# Patient Record
Sex: FEMALE | Race: WHITE | NOT HISPANIC OR LATINO | Employment: OTHER | URBAN - METROPOLITAN AREA
[De-identification: names, ages, dates, MRNs, and addresses within clinical notes are randomized per-mention and may not be internally consistent; named-entity substitution may affect disease eponyms.]

---

## 2018-02-21 ENCOUNTER — OFFICE VISIT (OUTPATIENT)
Dept: CARDIOLOGY | Facility: CLINIC | Age: 73
End: 2018-02-21
Payer: MEDICARE

## 2018-02-21 VITALS
SYSTOLIC BLOOD PRESSURE: 133 MMHG | WEIGHT: 179.5 LBS | BODY MASS INDEX: 31.8 KG/M2 | HEIGHT: 63 IN | HEART RATE: 75 BPM | DIASTOLIC BLOOD PRESSURE: 71 MMHG

## 2018-02-21 DIAGNOSIS — I10 ESSENTIAL HYPERTENSION: ICD-10-CM

## 2018-02-21 DIAGNOSIS — I48.20 CHRONIC ATRIAL FIBRILLATION: Primary | ICD-10-CM

## 2018-02-21 DIAGNOSIS — I11.9 HYPERTENSIVE HEART DISEASE WITHOUT HEART FAILURE: ICD-10-CM

## 2018-02-21 DIAGNOSIS — J42 CHRONIC BRONCHITIS, UNSPECIFIED CHRONIC BRONCHITIS TYPE: ICD-10-CM

## 2018-02-21 DIAGNOSIS — E11.9 TYPE 2 DIABETES MELLITUS WITHOUT COMPLICATION, WITHOUT LONG-TERM CURRENT USE OF INSULIN: ICD-10-CM

## 2018-02-21 PROCEDURE — 3008F BODY MASS INDEX DOCD: CPT | Mod: S$GLB,,, | Performed by: INTERNAL MEDICINE

## 2018-02-21 PROCEDURE — 93000 ELECTROCARDIOGRAM COMPLETE: CPT | Mod: S$GLB,,, | Performed by: INTERNAL MEDICINE

## 2018-02-21 PROCEDURE — 1159F MED LIST DOCD IN RCRD: CPT | Mod: S$GLB,,, | Performed by: INTERNAL MEDICINE

## 2018-02-21 PROCEDURE — 99213 OFFICE O/P EST LOW 20 MIN: CPT | Mod: S$GLB,,, | Performed by: INTERNAL MEDICINE

## 2018-02-21 NOTE — PROGRESS NOTES
Subjective:      Patient ID: Teresita Hodges is a 72 y.o. female.    Chief Complaint: Follow-up    HPI:   Doing fine except for chronic back pain.  Used to go to pain management.     Review of Systems   Cardiovascular: Positive for dyspnea on exertion (chronic) and leg swelling (chronic). Negative for chest pain, claudication, irregular heartbeat, near-syncope, orthopnea, palpitations and syncope.      Still smokes a pack of cigarets a day.    Last HgbA1c 6.7  Past Medical History:   Diagnosis Date    Atrial fibrillation     COPD (chronic obstructive pulmonary disease)     Diabetes mellitus     HCVD (hypertensive cardiovascular disease)     Hyperlipidemia     Hypernatremia     Hypertension     Insomnia     Restless leg syndrome         No past surgical history on file.    No family history on file.    Social History     Social History    Marital status:      Spouse name: N/A    Number of children: N/A    Years of education: N/A     Social History Main Topics    Smoking status: Current Every Day Smoker     Packs/day: 1.00    Smokeless tobacco: Never Used    Alcohol use None    Drug use: Unknown    Sexual activity: Not Asked     Other Topics Concern    None     Social History Narrative    None       Current Outpatient Prescriptions on File Prior to Visit   Medication Sig Dispense Refill    aspirin (ECOTRIN) 81 MG EC tablet Take 81 mg by mouth once daily.      bumetanide (BUMEX) 1 MG tablet Take 1 mg by mouth once a week.       cholecalciferol, vitamin D3, 2,000 unit Tab Take by mouth once daily.      clonazePAM (KLONOPIN) 0.5 MG tablet Take 0.5 mg by mouth once daily.       diltiazem (CARDIZEM CD) 240 MG 24 hr capsule every morning.       diltiazem (CARDIZEM) 60 MG tablet Take 60 mg by mouth every evening.      DIPHENHYDRAMINE HCL (BENADRYL ORAL) Take by mouth.      fish oil-omega-3 fatty acids 300-1,000 mg capsule Take 2 g by mouth 2 (two) times daily.      FOLIC  "ACID/MULTIVIT-MIN/LUTEIN (CENTRUM SILVER ORAL) Take by mouth.      gabapentin (NEURONTIN) 300 MG capsule Take 300 mg by mouth every evening.      glimepiride (AMARYL) 1 MG tablet Take 1 mg by mouth 3 (three) times daily.       guaifenesin-codeine 100-10 mg/5 ml (TUSSI-ORGANIDIN NR)  mg/5 mL syrup Take 5 mLs by mouth 3 (three) times daily as needed for Cough.      hydrochlorothiazide (HYDRODIURIL) 25 MG tablet Take 25 mg by mouth once daily.       hydrochlorothiazide (MICROZIDE) 12.5 mg capsule Take 12.5 mg by mouth once daily.       hydrocodone-acetaminophen 7.5-325mg (NORCO) 7.5-325 mg per tablet       losartan (COZAAR) 50 MG tablet Take 50 mg by mouth once daily.       metformin (GLUCOPHAGE) 500 MG tablet Take 500 mg by mouth 2 (two) times daily with meals.       nabumetone (RELAFEN) 500 MG tablet Take 500 mg by mouth once daily.       oxybutynin (DITROPAN-XL) 5 MG TR24 5 mg 2 (two) times daily.       potassium chloride (MICRO-K) 10 MEQ CpSR 10 mEq 2 (two) times daily.       PROAIR HFA 90 mcg/actuation inhaler       tizanidine (ZANAFLEX) 2 MG tablet Take 2 mg by mouth every 8 (eight) hours.        No current facility-administered medications on file prior to visit.        Review of patient's allergies indicates:   Allergen Reactions    Keflex [cephalexin]      Objective:     Vitals:    02/21/18 1335   BP: 133/71   BP Location: Left arm   Patient Position: Sitting   BP Method: Large (Automatic)   Pulse: 75   Weight: 81.4 kg (179 lb 8 oz)   Height: 5' 3" (1.6 m)        Physical Exam   Constitutional: She is oriented to person, place, and time. She appears well-developed and well-nourished.   Eyes: No scleral icterus.   Neck: No JVD present. Carotid bruit is not present.   Cardiovascular: Normal rate.  An irregularly irregular rhythm present. Exam reveals no gallop.    No murmur heard.  Pulmonary/Chest: Breath sounds normal.   Musculoskeletal: She exhibits edema (trace  pitting pedal edema " bilaterally ).   Neurological: She is alert and oriented to person, place, and time.   Skin: Skin is warm and dry.   Psychiatric: She has a normal mood and affect. Her behavior is normal. Judgment and thought content normal.   Vitals reviewed.     ECG: atrial fibrillation with controlled ventricular response, possible anterior infarct, no significant change.     Wt up 2 lbs    Assessment:     1. Chronic atrial fibrillation    2. Essential hypertension    3. Hypertensive heart disease without heart failure    4. Chronic bronchitis, unspecified chronic bronchitis type    5. Type 2 diabetes mellitus without complication, without long-term current use of insulin      Plan:   Teresita was seen today for follow-up.    Diagnoses and all orders for this visit:    Chronic atrial fibrillation  -     EKG 12-lead    Essential hypertension    Hypertensive heart disease without heart failure    Chronic bronchitis, unspecified chronic bronchitis type    Type 2 diabetes mellitus without complication, without long-term current use of insulin     Same meds  RTC 6 months  F/u with Dr Maldonado who does labs  Smoking cessation counseled  I have explained to pt in detail and at length why the risk of a blood clot stroke is much greater than her risk of bleeding from Eliquis.  I have explained that I strongly recommend pt take Eliquis in place of ASA.  Pt declines.Pt is concerned about the risk of bleeding.    Follow-up in about 6 months (around 8/21/2018).

## 2018-08-29 ENCOUNTER — OFFICE VISIT (OUTPATIENT)
Dept: CARDIOLOGY | Facility: CLINIC | Age: 73
End: 2018-08-29
Payer: MEDICARE

## 2018-08-29 VITALS
WEIGHT: 170 LBS | HEART RATE: 70 BPM | HEIGHT: 63 IN | DIASTOLIC BLOOD PRESSURE: 53 MMHG | SYSTOLIC BLOOD PRESSURE: 101 MMHG | BODY MASS INDEX: 30.12 KG/M2

## 2018-08-29 DIAGNOSIS — I10 ESSENTIAL HYPERTENSION: ICD-10-CM

## 2018-08-29 DIAGNOSIS — I48.20 CHRONIC ATRIAL FIBRILLATION: Primary | ICD-10-CM

## 2018-08-29 DIAGNOSIS — J42 CHRONIC BRONCHITIS, UNSPECIFIED CHRONIC BRONCHITIS TYPE: ICD-10-CM

## 2018-08-29 DIAGNOSIS — I11.9 HYPERTENSIVE HEART DISEASE WITHOUT HEART FAILURE: ICD-10-CM

## 2018-08-29 DIAGNOSIS — E11.9 TYPE 2 DIABETES MELLITUS WITHOUT COMPLICATION, WITHOUT LONG-TERM CURRENT USE OF INSULIN: ICD-10-CM

## 2018-08-29 PROCEDURE — 93000 ELECTROCARDIOGRAM COMPLETE: CPT | Mod: S$GLB,,, | Performed by: INTERNAL MEDICINE

## 2018-08-29 PROCEDURE — 3078F DIAST BP <80 MM HG: CPT | Mod: CPTII,S$GLB,, | Performed by: INTERNAL MEDICINE

## 2018-08-29 PROCEDURE — 99214 OFFICE O/P EST MOD 30 MIN: CPT | Mod: S$GLB,,, | Performed by: INTERNAL MEDICINE

## 2018-08-29 PROCEDURE — 3074F SYST BP LT 130 MM HG: CPT | Mod: CPTII,S$GLB,, | Performed by: INTERNAL MEDICINE

## 2018-08-29 RX ORDER — HYDROCODONE BITARTRATE AND ACETAMINOPHEN 10; 325 MG/1; MG/1
TABLET ORAL
COMMUNITY
Start: 2018-08-28 | End: 2019-02-06

## 2018-08-29 NOTE — PROGRESS NOTES
Subjective:      Patient ID: Teresita Hodges is a 73 y.o. female.    Chief Complaint: Follow-up (Afib)    HPI:  Pt still has chronic low back pain    Review of Systems   Cardiovascular: Positive for leg swelling. Negative for chest pain, claudication, dyspnea on exertion, irregular heartbeat, near-syncope, orthopnea, palpitations and syncope.      Can walk short distances with a walker.    Sister just bled to death on warfarin.     bled from colon on warfarin and  years ago.  Past Medical History:   Diagnosis Date    Atrial fibrillation     COPD (chronic obstructive pulmonary disease)     Diabetes mellitus     HCVD (hypertensive cardiovascular disease)     Hyperlipidemia     Hypernatremia     Hypertension     Insomnia     Restless leg syndrome         History reviewed. No pertinent surgical history.    History reviewed. No pertinent family history.    Social History     Socioeconomic History    Marital status:      Spouse name: None    Number of children: None    Years of education: None    Highest education level: None   Social Needs    Financial resource strain: None    Food insecurity - worry: None    Food insecurity - inability: None    Transportation needs - medical: None    Transportation needs - non-medical: None   Occupational History    None   Tobacco Use    Smoking status: Current Every Day Smoker     Packs/day: 1.00    Smokeless tobacco: Never Used   Substance and Sexual Activity    Alcohol use: None    Drug use: None    Sexual activity: None   Other Topics Concern    None   Social History Narrative    None       Current Outpatient Medications on File Prior to Visit   Medication Sig Dispense Refill    aspirin (ECOTRIN) 81 MG EC tablet Take 81 mg by mouth once daily.      bumetanide (BUMEX) 1 MG tablet Take 1 mg by mouth once a week.       cholecalciferol, vitamin D3, 2,000 unit Tab Take by mouth once daily.      clonazePAM (KLONOPIN) 0.5 MG tablet Take  "0.5 mg by mouth once daily.       diltiazem (CARDIZEM CD) 240 MG 24 hr capsule every morning.       diltiazem (CARDIZEM) 60 MG tablet Take 60 mg by mouth every evening.      DIPHENHYDRAMINE HCL (BENADRYL ORAL) Take by mouth.      fish oil-omega-3 fatty acids 300-1,000 mg capsule Take 2 g by mouth 2 (two) times daily.      FOLIC ACID/MULTIVIT-MIN/LUTEIN (CENTRUM SILVER ORAL) Take by mouth.      gabapentin (NEURONTIN) 300 MG capsule Take 300 mg by mouth every evening.      glimepiride (AMARYL) 1 MG tablet Take 1 mg by mouth 3 (three) times daily as needed.       guaifenesin-codeine 100-10 mg/5 ml (TUSSI-ORGANIDIN NR)  mg/5 mL syrup Take 5 mLs by mouth 3 (three) times daily as needed for Cough.      hydrochlorothiazide (HYDRODIURIL) 25 MG tablet Take 25 mg by mouth once daily.       hydrochlorothiazide (MICROZIDE) 12.5 mg capsule Take 12.5 mg by mouth once daily.       HYDROcodone-acetaminophen (NORCO)  mg per tablet       losartan (COZAAR) 50 MG tablet Take 50 mg by mouth once daily.       metformin (GLUCOPHAGE) 500 MG tablet Take 500 mg by mouth 2 (two) times daily with meals.       oxybutynin (DITROPAN-XL) 5 MG TR24 5 mg 2 (two) times daily.       potassium chloride (MICRO-K) 10 MEQ CpSR 10 mEq 2 (two) times daily.       PROAIR HFA 90 mcg/actuation inhaler       tizanidine (ZANAFLEX) 2 MG tablet Take 2 mg by mouth every 8 (eight) hours.       [DISCONTINUED] hydrocodone-acetaminophen 7.5-325mg (NORCO) 7.5-325 mg per tablet       [DISCONTINUED] nabumetone (RELAFEN) 500 MG tablet Take 500 mg by mouth once daily.        No current facility-administered medications on file prior to visit.        Review of patient's allergies indicates:   Allergen Reactions    Keflex [cephalexin]      Objective:     Vitals:    08/29/18 1355   BP: (!) 101/53   BP Location: Left arm   Patient Position: Sitting   BP Method: Large (Automatic)   Pulse: 70   Weight: 77.1 kg (170 lb)   Height: 5' 3" (1.6 m)    "     Physical Exam   Constitutional: She is oriented to person, place, and time. She appears well-developed and well-nourished.   Eyes: No scleral icterus.   Neck: No JVD present. Carotid bruit is not present.   Cardiovascular: Normal rate. An irregularly irregular rhythm present. Exam reveals no gallop.   No murmur heard.  Pulses:       Dorsalis pedis pulses are 1+ on the right side, and 2+ on the left side.        Posterior tibial pulses are 1+ on the right side, and 2+ on the left side.   Pulmonary/Chest: She has wheezes in the right upper field, the right middle field, the right lower field, the left upper field, the left middle field and the left lower field.   Musculoskeletal: She exhibits edema (trace edema bilat).   Neurological: She is alert and oriented to person, place, and time.   Skin: Skin is warm and dry.   Psychiatric: She has a normal mood and affect. Her behavior is normal. Judgment and thought content normal.   Vitals reviewed.     ECG: atrial fibrillation with controlled ventricular response, right axis deviation, anterior scar. Unchanged  Assessment:     1. Chronic atrial fibrillation    2. Hypertensive heart disease without heart failure    3. Essential hypertension    4. Type 2 diabetes mellitus without complication, without long-term current use of insulin    5. Chronic bronchitis, unspecified chronic bronchitis type      Plan:   Teresita was seen today for follow-up.    Diagnoses and all orders for this visit:    Chronic atrial fibrillation  -     IN OFFICE EKG 12-LEAD (to [facility])    Hypertensive heart disease without heart failure    Essential hypertension    Type 2 diabetes mellitus without complication, without long-term current use of insulin    Chronic bronchitis, unspecified chronic bronchitis type       Pt still declines anticoagulation due to fear of hemorrhagic complications even though pt understands the benefit of preventing blood clot strokes.    Same meds      F/u with   izabel    RTC 6 months  Follow-up in about 6 months (around 2/28/2019).

## 2019-02-06 ENCOUNTER — OFFICE VISIT (OUTPATIENT)
Dept: CARDIOLOGY | Facility: CLINIC | Age: 74
End: 2019-02-06
Payer: MEDICARE

## 2019-02-06 VITALS
HEIGHT: 63 IN | DIASTOLIC BLOOD PRESSURE: 70 MMHG | HEART RATE: 87 BPM | SYSTOLIC BLOOD PRESSURE: 132 MMHG | BODY MASS INDEX: 31.18 KG/M2 | WEIGHT: 176 LBS

## 2019-02-06 DIAGNOSIS — J41.0 SIMPLE CHRONIC BRONCHITIS: ICD-10-CM

## 2019-02-06 DIAGNOSIS — I48.20 CHRONIC ATRIAL FIBRILLATION: Primary | ICD-10-CM

## 2019-02-06 DIAGNOSIS — R01.1 SYSTOLIC MURMUR: ICD-10-CM

## 2019-02-06 DIAGNOSIS — I10 ESSENTIAL HYPERTENSION: ICD-10-CM

## 2019-02-06 DIAGNOSIS — R60.0 LOCALIZED EDEMA: ICD-10-CM

## 2019-02-06 PROCEDURE — 1101F PR PT FALLS ASSESS DOC 0-1 FALLS W/OUT INJ PAST YR: ICD-10-PCS | Mod: CPTII,S$GLB,, | Performed by: INTERNAL MEDICINE

## 2019-02-06 PROCEDURE — 1101F PT FALLS ASSESS-DOCD LE1/YR: CPT | Mod: CPTII,S$GLB,, | Performed by: INTERNAL MEDICINE

## 2019-02-06 PROCEDURE — 3075F PR MOST RECENT SYSTOLIC BLOOD PRESS GE 130-139MM HG: ICD-10-PCS | Mod: CPTII,S$GLB,, | Performed by: INTERNAL MEDICINE

## 2019-02-06 PROCEDURE — 99213 PR OFFICE/OUTPT VISIT, EST, LEVL III, 20-29 MIN: ICD-10-PCS | Mod: S$GLB,,, | Performed by: INTERNAL MEDICINE

## 2019-02-06 PROCEDURE — 3078F PR MOST RECENT DIASTOLIC BLOOD PRESSURE < 80 MM HG: ICD-10-PCS | Mod: CPTII,S$GLB,, | Performed by: INTERNAL MEDICINE

## 2019-02-06 PROCEDURE — 99213 OFFICE O/P EST LOW 20 MIN: CPT | Mod: S$GLB,,, | Performed by: INTERNAL MEDICINE

## 2019-02-06 PROCEDURE — 93000 ELECTROCARDIOGRAM COMPLETE: CPT | Mod: S$GLB,,, | Performed by: INTERNAL MEDICINE

## 2019-02-06 PROCEDURE — 93000 EKG 12-LEAD: ICD-10-PCS | Mod: S$GLB,,, | Performed by: INTERNAL MEDICINE

## 2019-02-06 PROCEDURE — 3075F SYST BP GE 130 - 139MM HG: CPT | Mod: CPTII,S$GLB,, | Performed by: INTERNAL MEDICINE

## 2019-02-06 PROCEDURE — 3078F DIAST BP <80 MM HG: CPT | Mod: CPTII,S$GLB,, | Performed by: INTERNAL MEDICINE

## 2019-02-06 RX ORDER — CODEINE PHOSPHATE AND GUAIFENESIN 10; 100 MG/5ML; MG/5ML
5 SOLUTION ORAL 3 TIMES DAILY PRN
COMMUNITY
End: 2020-12-17

## 2019-02-06 RX ORDER — HYDROCODONE BITARTRATE AND IBUPROFEN 7.5; 2 MG/1; MG/1
1 TABLET, FILM COATED ORAL
COMMUNITY
Start: 2019-01-14 | End: 2019-08-07

## 2019-02-06 NOTE — PROGRESS NOTES
Subjective:      Patient ID: Teresita Hodges is a 73 y.o. female.    Chief Complaint: Follow-up (Afib)    HPI: Feels OK.   Can walk short distances with walker.  Walks in house.  Has spasms in back  Dr Maldonado referred pt to Dr Roberson, neurologist.  Pt has seen Dr Cruz and Danny and Lynette    Review of Systems   Cardiovascular: Positive for leg swelling (chronic, bilateral). Negative for chest pain, claudication, dyspnea on exertion, irregular heartbeat, near-syncope, orthopnea, palpitations and syncope.        Past Medical History:   Diagnosis Date    Atrial fibrillation     COPD (chronic obstructive pulmonary disease)     Diabetes mellitus     HCVD (hypertensive cardiovascular disease)     Hyperlipidemia     Hypernatremia     Hypertension     Insomnia     Restless leg syndrome         No past surgical history on file.    No family history on file.    Social History     Socioeconomic History    Marital status:      Spouse name: None    Number of children: None    Years of education: None    Highest education level: None   Social Needs    Financial resource strain: None    Food insecurity - worry: None    Food insecurity - inability: None    Transportation needs - medical: None    Transportation needs - non-medical: None   Occupational History    None   Tobacco Use    Smoking status: Current Every Day Smoker     Packs/day: 1.00    Smokeless tobacco: Never Used   Substance and Sexual Activity    Alcohol use: None    Drug use: None    Sexual activity: None   Other Topics Concern    None   Social History Narrative    None       Current Outpatient Medications on File Prior to Visit   Medication Sig Dispense Refill    aspirin (ECOTRIN) 81 MG EC tablet Take 81 mg by mouth once daily.      bumetanide (BUMEX) 1 MG tablet Take 1 mg by mouth once a week.       cholecalciferol, vitamin D3, 2,000 unit Tab Take by mouth once daily.      clonazePAM (KLONOPIN) 0.5 MG tablet  Take 0.5 mg by mouth once daily.       diltiazem (CARDIZEM CD) 240 MG 24 hr capsule every morning.       diltiazem (CARDIZEM) 60 MG tablet Take 60 mg by mouth every evening.      DIPHENHYDRAMINE HCL (BENADRYL ORAL) Take by mouth.      fish oil-omega-3 fatty acids 300-1,000 mg capsule Take 2 g by mouth 2 (two) times daily.      gabapentin (NEURONTIN) 300 MG capsule Take 300 mg by mouth every evening.      glimepiride (AMARYL) 1 MG tablet Take 1 mg by mouth 3 (three) times daily as needed.       guaifenesin-codeine 100-10 mg/5 ml (TUSSI-ORGANIDIN NR)  mg/5 mL syrup Take 5 mLs by mouth 3 (three) times daily as needed for Cough.      hydrocodone-ibuprofen 7.5-200 mg (VICOPROFEN) 7.5-200 mg per tablet 1 tablet. Take one tablet 5 times daily      metformin (GLUCOPHAGE) 500 MG tablet Take 500 mg by mouth 2 (two) times daily with meals.       oxybutynin (DITROPAN-XL) 5 MG TR24 5 mg 2 (two) times daily.       potassium chloride (MICRO-K) 10 MEQ CpSR 10 mEq 2 (two) times daily.       PROAIR HFA 90 mcg/actuation inhaler       tizanidine (ZANAFLEX) 2 MG tablet Take 2 mg by mouth every 8 (eight) hours.       [DISCONTINUED] FOLIC ACID/MULTIVIT-MIN/LUTEIN (CENTRUM SILVER ORAL) Take by mouth.      [DISCONTINUED] guaifenesin-codeine 100-10 mg/5 ml (TUSSI-ORGANIDIN NR)  mg/5 mL syrup Take 5 mLs by mouth 3 (three) times daily as needed for Cough.      [DISCONTINUED] hydrochlorothiazide (HYDRODIURIL) 25 MG tablet Take 25 mg by mouth once daily.       [DISCONTINUED] hydrochlorothiazide (MICROZIDE) 12.5 mg capsule Take 12.5 mg by mouth once daily.       [DISCONTINUED] HYDROcodone-acetaminophen (NORCO)  mg per tablet       [DISCONTINUED] losartan (COZAAR) 50 MG tablet Take 50 mg by mouth once daily.        No current facility-administered medications on file prior to visit.        Review of patient's allergies indicates:   Allergen Reactions    Keflex [cephalexin]      Objective:     Vitals:     "02/06/19 1434 02/06/19 1502   BP: 132/70    BP Location: Left arm    Patient Position: Sitting    BP Method: Large (Automatic)    Pulse: (!) 44 87  Comment: irregular   Weight: 79.8 kg (176 lb)    Height: 5' 3" (1.6 m)         Physical Exam   Constitutional: She is oriented to person, place, and time. She appears well-developed and well-nourished.   Eyes: No scleral icterus.   Neck: No JVD present. Carotid bruit is not present.   Cardiovascular: Normal rate. An irregularly irregular rhythm present. Exam reveals no gallop.   Murmur (III/VI systolic) heard.  Pulmonary/Chest: She has wheezes.   Musculoskeletal: She exhibits edema (trace to one plus edema bilaterally).   Neurological: She is alert and oriented to person, place, and time.   Skin: Skin is warm and dry.   Psychiatric: She has a normal mood and affect. Her behavior is normal. Judgment and thought content normal.   Vitals reviewed.     ECG: atrial fibrillation with controlled ventricular response, Olaf beats, abnormal R wave progression in precordial leads.Unchanged    Wt relatively stable  Assessment:     1. Chronic atrial fibrillation    2. Essential hypertension    3. Simple chronic bronchitis    4. Systolic murmur    5. Localized edema      Plan:   Teresita was seen today for follow-up.    Diagnoses and all orders for this visit:    Chronic atrial fibrillation  -     IN OFFICE EKG 12-LEAD (to Muse)  -     Transthoracic echo (TTE) complete (Cupid Only); Future    Essential hypertension    Simple chronic bronchitis    Systolic murmur  -     Transthoracic echo (TTE) complete (Cupid Only); Future    Localized edema     Pt advised to quit cigarettes    Edema due to diltiazem    Same meds    RTC 6 months    F/u with Dr Maldonado who does labs.    Echocardiogram to evaluate heart murmur    Follow-up in about 6 months (around 8/6/2019).     Pt again declines formal anticoagulation.  I have explained to pt again the benefit of anticoagulation for preventing a " stroke    F/u with Dr Maldonado who does labs

## 2019-08-07 ENCOUNTER — OFFICE VISIT (OUTPATIENT)
Dept: CARDIOLOGY | Facility: CLINIC | Age: 74
End: 2019-08-07
Payer: MEDICARE

## 2019-08-07 VITALS
BODY MASS INDEX: 30.48 KG/M2 | SYSTOLIC BLOOD PRESSURE: 139 MMHG | HEART RATE: 80 BPM | HEIGHT: 63 IN | WEIGHT: 172 LBS | DIASTOLIC BLOOD PRESSURE: 62 MMHG

## 2019-08-07 DIAGNOSIS — I11.9 HYPERTENSIVE HEART DISEASE WITHOUT HEART FAILURE: ICD-10-CM

## 2019-08-07 DIAGNOSIS — E11.9 TYPE 2 DIABETES MELLITUS WITHOUT COMPLICATION, WITHOUT LONG-TERM CURRENT USE OF INSULIN: ICD-10-CM

## 2019-08-07 DIAGNOSIS — R60.0 LOCALIZED EDEMA: ICD-10-CM

## 2019-08-07 DIAGNOSIS — I10 ESSENTIAL HYPERTENSION: ICD-10-CM

## 2019-08-07 DIAGNOSIS — I48.20 CHRONIC ATRIAL FIBRILLATION: Primary | ICD-10-CM

## 2019-08-07 PROCEDURE — 3077F SYST BP >= 140 MM HG: CPT | Mod: CPTII,S$GLB,, | Performed by: INTERNAL MEDICINE

## 2019-08-07 PROCEDURE — 3078F PR MOST RECENT DIASTOLIC BLOOD PRESSURE < 80 MM HG: ICD-10-PCS | Mod: CPTII,S$GLB,, | Performed by: INTERNAL MEDICINE

## 2019-08-07 PROCEDURE — 3077F PR MOST RECENT SYSTOLIC BLOOD PRESSURE >= 140 MM HG: ICD-10-PCS | Mod: CPTII,S$GLB,, | Performed by: INTERNAL MEDICINE

## 2019-08-07 PROCEDURE — 93000 EKG 12-LEAD: ICD-10-PCS | Mod: S$GLB,,, | Performed by: INTERNAL MEDICINE

## 2019-08-07 PROCEDURE — 3078F DIAST BP <80 MM HG: CPT | Mod: CPTII,S$GLB,, | Performed by: INTERNAL MEDICINE

## 2019-08-07 PROCEDURE — 93000 ELECTROCARDIOGRAM COMPLETE: CPT | Mod: S$GLB,,, | Performed by: INTERNAL MEDICINE

## 2019-08-07 PROCEDURE — 99214 PR OFFICE/OUTPT VISIT, EST, LEVL IV, 30-39 MIN: ICD-10-PCS | Mod: S$GLB,,, | Performed by: INTERNAL MEDICINE

## 2019-08-07 PROCEDURE — 1101F PT FALLS ASSESS-DOCD LE1/YR: CPT | Mod: CPTII,S$GLB,, | Performed by: INTERNAL MEDICINE

## 2019-08-07 PROCEDURE — 1101F PR PT FALLS ASSESS DOC 0-1 FALLS W/OUT INJ PAST YR: ICD-10-PCS | Mod: CPTII,S$GLB,, | Performed by: INTERNAL MEDICINE

## 2019-08-07 PROCEDURE — 99214 OFFICE O/P EST MOD 30 MIN: CPT | Mod: S$GLB,,, | Performed by: INTERNAL MEDICINE

## 2019-08-07 RX ORDER — HYDROCODONE BITARTRATE AND ACETAMINOPHEN 10; 325 MG/1; MG/1
TABLET ORAL
COMMUNITY
Start: 2019-07-17 | End: 2020-12-17

## 2019-08-07 RX ORDER — HYDROCHLOROTHIAZIDE 12.5 MG/1
12.5 CAPSULE ORAL DAILY
COMMUNITY
End: 2021-07-13

## 2019-08-07 RX ORDER — HYDROCHLOROTHIAZIDE 25 MG/1
25 TABLET ORAL DAILY
COMMUNITY
Start: 2019-07-22 | End: 2021-07-13

## 2019-08-07 RX ORDER — ONDANSETRON 4 MG/1
TABLET, FILM COATED ORAL
COMMUNITY
Start: 2019-07-09

## 2019-08-07 RX ORDER — LOSARTAN POTASSIUM 50 MG/1
50 TABLET ORAL DAILY
COMMUNITY

## 2019-08-07 RX ORDER — DOCUSATE SODIUM 100 MG/1
100 CAPSULE, LIQUID FILLED ORAL 2 TIMES DAILY
COMMUNITY

## 2019-08-07 NOTE — PROGRESS NOTES
Subjective:      Patient ID: Teresita Hodges is a 74 y.o. female.    Chief Complaint: Follow-up (Afib)    HPI:   Main problem is chronic low back pain.    Pt is limited by right hip and back pain rather than shortness of breath.    Pt has been to PT.    Dr Mello retired.    Review of Systems   Cardiovascular: Positive for leg swelling. Negative for chest pain, claudication, dyspnea on exertion, irregular heartbeat, near-syncope, orthopnea, palpitations and syncope.        Past Medical History:   Diagnosis Date    Atrial fibrillation     COPD (chronic obstructive pulmonary disease)     Diabetes mellitus     HCVD (hypertensive cardiovascular disease)     Hyperlipidemia     Hypernatremia     Hypertension     Insomnia     Restless leg syndrome         No past surgical history on file.    No family history on file.    Social History     Socioeconomic History    Marital status:      Spouse name: Not on file    Number of children: Not on file    Years of education: Not on file    Highest education level: Not on file   Occupational History    Not on file   Social Needs    Financial resource strain: Not on file    Food insecurity:     Worry: Not on file     Inability: Not on file    Transportation needs:     Medical: Not on file     Non-medical: Not on file   Tobacco Use    Smoking status: Current Every Day Smoker     Packs/day: 1.00    Smokeless tobacco: Never Used   Substance and Sexual Activity    Alcohol use: Not on file    Drug use: Not on file    Sexual activity: Not on file   Lifestyle    Physical activity:     Days per week: Not on file     Minutes per session: Not on file    Stress: Not on file   Relationships    Social connections:     Talks on phone: Not on file     Gets together: Not on file     Attends Yazdanism service: Not on file     Active member of club or organization: Not on file     Attends meetings of clubs or organizations: Not on file     Relationship status:  Not on file   Other Topics Concern    Not on file   Social History Narrative    Not on file       Current Outpatient Medications on File Prior to Visit   Medication Sig Dispense Refill    aspirin (ECOTRIN) 81 MG EC tablet Take 81 mg by mouth once daily.      bumetanide (BUMEX) 1 MG tablet Take 1 mg by mouth once a week.       cholecalciferol, vitamin D3, 2,000 unit Tab Take by mouth once daily.      clonazePAM (KLONOPIN) 0.5 MG tablet Take 0.5 mg by mouth once daily.       diltiazem (CARDIZEM CD) 240 MG 24 hr capsule every morning.       diltiazem (CARDIZEM) 60 MG tablet Take 60 mg by mouth every evening.      DIPHENHYDRAMINE HCL (BENADRYL ORAL) Take by mouth.      docusate sodium (COLACE) 100 MG capsule Take 100 mg by mouth 2 (two) times daily.      fish oil-omega-3 fatty acids 300-1,000 mg capsule Take 2 g by mouth 2 (two) times daily.      gabapentin (NEURONTIN) 300 MG capsule Take 300 mg by mouth 2 (two) times daily.       glimepiride (AMARYL) 1 MG tablet Take 1 mg by mouth once daily.       guaifenesin-codeine 100-10 mg/5 ml (TUSSI-ORGANIDIN NR)  mg/5 mL syrup Take 5 mLs by mouth 3 (three) times daily as needed for Cough.      hydroCHLOROthiazide (HYDRODIURIL) 25 MG tablet       hydroCHLOROthiazide (MICROZIDE) 12.5 mg capsule Take 12.5 mg by mouth once daily.      HYDROcodone-acetaminophen (NORCO)  mg per tablet       losartan (COZAAR) 50 MG tablet Take 50 mg by mouth once daily.      metformin (GLUCOPHAGE) 500 MG tablet Take 500 mg by mouth 2 (two) times daily with meals.       ondansetron (ZOFRAN) 4 MG tablet       oxybutynin (DITROPAN-XL) 5 MG TR24 5 mg 2 (two) times daily.       potassium chloride (MICRO-K) 10 MEQ CpSR 10 mEq 2 (two) times daily.       PROAIR HFA 90 mcg/actuation inhaler       tizanidine (ZANAFLEX) 2 MG tablet Take 2 mg by mouth every 8 (eight) hours.       [DISCONTINUED] hydrocodone-ibuprofen 7.5-200 mg (VICOPROFEN) 7.5-200 mg per tablet 1 tablet. Take  "one tablet 5 times daily       No current facility-administered medications on file prior to visit.        Review of patient's allergies indicates:   Allergen Reactions    Keflex [cephalexin]      Objective:     Vitals:    08/07/19 1502 08/07/19 1542   BP: (!) 148/79 139/62   BP Location: Left arm Left arm   Patient Position: Sitting Sitting   BP Method: Large (Automatic)    Pulse: 80    Weight: 78 kg (172 lb)    Height: 5' 3" (1.6 m)         Physical Exam   Constitutional: She is oriented to person, place, and time. She appears well-developed and well-nourished.   Eyes: No scleral icterus.   Neck: No JVD present. Carotid bruit is not present.   Cardiovascular: Normal rate and regular rhythm. Exam reveals no gallop.   Murmur (III/VI systolic) heard.  Pulmonary/Chest: She has wheezes.   Musculoskeletal: She exhibits edema (trace bilateral ).   Neurological: She is alert and oriented to person, place, and time.   Skin: Skin is warm and dry.   Psychiatric: She has a normal mood and affect. Her behavior is normal. Judgment and thought content normal.   Vitals reviewed.     Last HgbA1C 7    ECG today: atrial fibrillation with controlled ventricular response, PVC's or Olaf beats, Possible anteroseptal infarct, unchanged  Assessment:     1. Chronic atrial fibrillation    2. Essential hypertension    3. Hypertensive heart disease without heart failure    4. Type 2 diabetes mellitus without complication, without long-term current use of insulin    5. Localized edema      Plan:   Teresita was seen today for follow-up.    Diagnoses and all orders for this visit:    Chronic atrial fibrillation  -     IN OFFICE EKG 12-LEAD (to Muse)    Essential hypertension    Hypertensive heart disease without heart failure    Type 2 diabetes mellitus without complication, without long-term current use of insulin    Localized edema       Pt declines anticoagulation and declines Watchman device even though she is aware of her increased risk " of stroke.    Will re-schedule echocardiogram    Same meds    F/u with opthalmologist    Follow up in about 6 months (around 2/7/2020).

## 2019-08-26 ENCOUNTER — HOSPITAL ENCOUNTER (OUTPATIENT)
Dept: CARDIOLOGY | Facility: OTHER | Age: 74
Discharge: HOME | End: 2019-08-26
Attending: INTERNAL MEDICINE
Payer: MEDICARE

## 2019-08-26 ENCOUNTER — TELEPHONE (OUTPATIENT)
Dept: CARDIOLOGY | Facility: CLINIC | Age: 74
End: 2019-08-26

## 2019-08-26 VITALS — BODY MASS INDEX: 30.48 KG/M2 | WEIGHT: 172 LBS | HEIGHT: 63 IN

## 2019-08-26 DIAGNOSIS — I48.20 CHRONIC ATRIAL FIBRILLATION: ICD-10-CM

## 2019-08-26 DIAGNOSIS — R01.1 SYSTOLIC MURMUR: ICD-10-CM

## 2019-08-26 LAB
AORTIC ROOT ANNULUS: 2.21 CM
AORTIC VALVE CUSP SEPERATION: 1.36 CM
AV INDEX (PROSTH): 0.48
AV MEAN GRADIENT: 9 MMHG
AV PEAK GRADIENT: 16 MMHG
AV VALVE AREA: 1.47 CM2
AV VELOCITY RATIO: 0.44
BSA FOR ECHO PROCEDURE: 1.86 M2
CV ECHO LV RWT: 0.53 CM
DOP CALC AO PEAK VEL: 1.98 M/S
DOP CALC AO VTI: 38.23 CM
DOP CALC LVOT AREA: 3 CM2
DOP CALC LVOT DIAMETER: 1.97 CM
DOP CALC LVOT PEAK VEL: 0.87 M/S
DOP CALC LVOT STROKE VOLUME: 56.27 CM3
DOP CALCLVOT PEAK VEL VTI: 18.47 CM
E WAVE DECELERATION TIME: 307.69 MSEC
E/E' RATIO: 17.13 M/S
ECHO LV POSTERIOR WALL: 1.17 CM (ref 0.6–1.1)
FRACTIONAL SHORTENING: 27 % (ref 28–44)
INTERVENTRICULAR SEPTUM: 1.24 CM (ref 0.6–1.1)
LEFT ATRIUM SIZE: 5 CM
LEFT INTERNAL DIMENSION IN SYSTOLE: 3.24 CM (ref 2.1–4)
LEFT VENTRICLE DIASTOLIC VOLUME INDEX: 48.68 ML/M2
LEFT VENTRICLE DIASTOLIC VOLUME: 88.29 ML
LEFT VENTRICLE MASS INDEX: 107 G/M2
LEFT VENTRICLE SYSTOLIC VOLUME INDEX: 23.3 ML/M2
LEFT VENTRICLE SYSTOLIC VOLUME: 42.19 ML
LEFT VENTRICULAR INTERNAL DIMENSION IN DIASTOLE: 4.41 CM (ref 3.5–6)
LEFT VENTRICULAR MASS: 193.17 G
LV LATERAL E/E' RATIO: 19.57 M/S
LV SEPTAL E/E' RATIO: 15.22 M/S
MV PEAK E VEL: 1.37 M/S
PISA TR MAX VEL: 2.96 M/S
PV PEAK VELOCITY: 1.15 CM/S
RA PRESSURE: 3 MMHG
RIGHT VENTRICULAR END-DIASTOLIC DIMENSION: 3.2 CM
SINUS: 2.72 CM
STJ: 2.24 CM
TDI LATERAL: 0.07 M/S
TDI SEPTAL: 0.09 M/S
TDI: 0.08 M/S
TR MAX PG: 35 MMHG
TV REST PULMONARY ARTERY PRESSURE: 38 MMHG

## 2019-08-26 PROCEDURE — 93306 TTE W/DOPPLER COMPLETE: CPT

## 2019-08-26 PROCEDURE — 93306 TTE W/DOPPLER COMPLETE: CPT | Mod: 26,,, | Performed by: INTERNAL MEDICINE

## 2019-08-26 PROCEDURE — 93306 TRANSTHORACIC ECHO (TTE) COMPLETE (CUPID ONLY): ICD-10-PCS | Mod: 26,,, | Performed by: INTERNAL MEDICINE

## 2019-08-27 NOTE — TELEPHONE ENCOUNTER
Repeat echocardiogram shows left ventricular systolic function remains normal.  Patient reassured.

## 2020-09-10 ENCOUNTER — TELEPHONE (OUTPATIENT)
Dept: CARDIOLOGY | Facility: CLINIC | Age: 75
End: 2020-09-10

## 2020-09-29 RX ORDER — DILTIAZEM HYDROCHLORIDE 60 MG/1
60 TABLET, FILM COATED ORAL NIGHTLY
Qty: 90 TABLET | Refills: 3 | Status: SHIPPED | OUTPATIENT
Start: 2020-09-29 | End: 2021-09-30 | Stop reason: SDUPTHER

## 2020-09-29 RX ORDER — DILTIAZEM HYDROCHLORIDE 240 MG/1
240 CAPSULE, COATED, EXTENDED RELEASE ORAL DAILY
Qty: 90 CAPSULE | Refills: 3 | Status: SHIPPED | OUTPATIENT
Start: 2020-09-29 | End: 2021-10-07

## 2020-09-29 NOTE — TELEPHONE ENCOUNTER
----- Message from Aidee Masters sent at 9/29/2020 10:53 AM CDT -----      Can the clinic reply in [facility]:N        Please refill the medication(s) listed below. Please call the patient when the prescription(s) is ready for  at this phone number         Medication #1 diltiazem (CARDIZEM) 60 MG tablet    Medication #2 diltiazem (CARDIZEM CD) 240 MG 24 hr capsule      Preferred Pharmacy: [id number] [place] [address] [address] [employer] [phone] (Phone)  [phone] (Fax)

## 2020-12-17 ENCOUNTER — LAB VISIT (OUTPATIENT)
Dept: LAB | Facility: HOSPITAL | Age: 75
End: 2020-12-17
Attending: INTERNAL MEDICINE
Payer: MEDICARE

## 2020-12-17 ENCOUNTER — OFFICE VISIT (OUTPATIENT)
Dept: CARDIOLOGY | Facility: CLINIC | Age: 75
End: 2020-12-17
Payer: MEDICARE

## 2020-12-17 VITALS
DIASTOLIC BLOOD PRESSURE: 70 MMHG | SYSTOLIC BLOOD PRESSURE: 130 MMHG | OXYGEN SATURATION: 93 % | HEART RATE: 72 BPM | BODY MASS INDEX: 30.49 KG/M2 | WEIGHT: 172.06 LBS | HEIGHT: 63 IN

## 2020-12-17 DIAGNOSIS — I10 ESSENTIAL HYPERTENSION: ICD-10-CM

## 2020-12-17 DIAGNOSIS — N39.3 STRESS INCONTINENCE OF URINE: ICD-10-CM

## 2020-12-17 DIAGNOSIS — I48.20 CHRONIC ATRIAL FIBRILLATION: ICD-10-CM

## 2020-12-17 DIAGNOSIS — I11.9 HYPERTENSIVE HEART DISEASE WITHOUT HEART FAILURE: ICD-10-CM

## 2020-12-17 DIAGNOSIS — J42 CHRONIC BRONCHITIS, UNSPECIFIED CHRONIC BRONCHITIS TYPE: ICD-10-CM

## 2020-12-17 DIAGNOSIS — I48.20 CHRONIC ATRIAL FIBRILLATION: Primary | ICD-10-CM

## 2020-12-17 DIAGNOSIS — E11.9 TYPE 2 DIABETES MELLITUS WITHOUT COMPLICATION, WITHOUT LONG-TERM CURRENT USE OF INSULIN: ICD-10-CM

## 2020-12-17 DIAGNOSIS — R60.0 LOCALIZED EDEMA: ICD-10-CM

## 2020-12-17 DIAGNOSIS — M54.40 CHRONIC MIDLINE LOW BACK PAIN WITH SCIATICA, SCIATICA LATERALITY UNSPECIFIED: ICD-10-CM

## 2020-12-17 DIAGNOSIS — G89.29 CHRONIC MIDLINE LOW BACK PAIN WITH SCIATICA, SCIATICA LATERALITY UNSPECIFIED: ICD-10-CM

## 2020-12-17 PROCEDURE — 36415 COLL VENOUS BLD VENIPUNCTURE: CPT

## 2020-12-17 PROCEDURE — 93000 EKG 12-LEAD: ICD-10-PCS | Mod: S$GLB,,, | Performed by: INTERNAL MEDICINE

## 2020-12-17 PROCEDURE — 3288F FALL RISK ASSESSMENT DOCD: CPT | Mod: CPTII,S$GLB,, | Performed by: INTERNAL MEDICINE

## 2020-12-17 PROCEDURE — 3078F DIAST BP <80 MM HG: CPT | Mod: CPTII,S$GLB,, | Performed by: INTERNAL MEDICINE

## 2020-12-17 PROCEDURE — 3075F PR MOST RECENT SYSTOLIC BLOOD PRESS GE 130-139MM HG: ICD-10-PCS | Mod: CPTII,S$GLB,, | Performed by: INTERNAL MEDICINE

## 2020-12-17 PROCEDURE — 80053 COMPREHEN METABOLIC PANEL: CPT

## 2020-12-17 PROCEDURE — 3075F SYST BP GE 130 - 139MM HG: CPT | Mod: CPTII,S$GLB,, | Performed by: INTERNAL MEDICINE

## 2020-12-17 PROCEDURE — 99999 PR PBB SHADOW E&M-EST. PATIENT-LVL IV: CPT | Mod: PBBFAC,,, | Performed by: INTERNAL MEDICINE

## 2020-12-17 PROCEDURE — 99999 PR PBB SHADOW E&M-EST. PATIENT-LVL IV: ICD-10-PCS | Mod: PBBFAC,,, | Performed by: INTERNAL MEDICINE

## 2020-12-17 PROCEDURE — 1159F MED LIST DOCD IN RCRD: CPT | Mod: S$GLB,,, | Performed by: INTERNAL MEDICINE

## 2020-12-17 PROCEDURE — 3008F PR BODY MASS INDEX (BMI) DOCUMENTED: ICD-10-PCS | Mod: CPTII,S$GLB,, | Performed by: INTERNAL MEDICINE

## 2020-12-17 PROCEDURE — 1101F PT FALLS ASSESS-DOCD LE1/YR: CPT | Mod: CPTII,S$GLB,, | Performed by: INTERNAL MEDICINE

## 2020-12-17 PROCEDURE — 93000 ELECTROCARDIOGRAM COMPLETE: CPT | Mod: S$GLB,,, | Performed by: INTERNAL MEDICINE

## 2020-12-17 PROCEDURE — 99214 PR OFFICE/OUTPT VISIT, EST, LEVL IV, 30-39 MIN: ICD-10-PCS | Mod: 25,S$GLB,, | Performed by: INTERNAL MEDICINE

## 2020-12-17 PROCEDURE — 3078F PR MOST RECENT DIASTOLIC BLOOD PRESSURE < 80 MM HG: ICD-10-PCS | Mod: CPTII,S$GLB,, | Performed by: INTERNAL MEDICINE

## 2020-12-17 PROCEDURE — 3008F BODY MASS INDEX DOCD: CPT | Mod: CPTII,S$GLB,, | Performed by: INTERNAL MEDICINE

## 2020-12-17 PROCEDURE — 99214 OFFICE O/P EST MOD 30 MIN: CPT | Mod: 25,S$GLB,, | Performed by: INTERNAL MEDICINE

## 2020-12-17 PROCEDURE — 1159F PR MEDICATION LIST DOCUMENTED IN MEDICAL RECORD: ICD-10-PCS | Mod: S$GLB,,, | Performed by: INTERNAL MEDICINE

## 2020-12-17 PROCEDURE — 83735 ASSAY OF MAGNESIUM: CPT

## 2020-12-17 PROCEDURE — 1101F PR PT FALLS ASSESS DOC 0-1 FALLS W/OUT INJ PAST YR: ICD-10-PCS | Mod: CPTII,S$GLB,, | Performed by: INTERNAL MEDICINE

## 2020-12-17 PROCEDURE — 3288F PR FALLS RISK ASSESSMENT DOCUMENTED: ICD-10-PCS | Mod: CPTII,S$GLB,, | Performed by: INTERNAL MEDICINE

## 2020-12-17 NOTE — PROGRESS NOTES
Subjective:      Patient ID: Teresita Hodges is a 75 y.o. female.    Chief Complaint: Follow-up    HPI:  Needs new pain management MD at [facility].    Main problem is chronic low back pain.    Pt has been off pain meds since July.    Pt does not walk down stairs any more due to fear of falling.    Pt has had 2 or 3 back surgeries in the past.    New PCP is Dr Kevin Mahan at [facility]".    Pt c/o urinary incontinence    Review of Systems   Cardiovascular: Positive for dyspnea on exertion and leg swelling. Negative for chest pain, claudication, irregular heartbeat, near-syncope, orthopnea, palpitations and syncope.        Past Medical History:   Diagnosis Date    Atrial fibrillation     COPD (chronic obstructive pulmonary disease)     Diabetes mellitus     HCVD (hypertensive cardiovascular disease)     Hyperlipidemia     Hypernatremia     Hypertension     Insomnia     Restless leg syndrome         No past surgical history on file.    No family history on file.    Social History     Socioeconomic History    Marital status:      Spouse name: Not on file    Number of children: Not on file    Years of education: Not on file    Highest education level: Not on file   Occupational History    Not on file   Social Needs    Financial resource strain: Not on file    Food insecurity     Worry: Not on file     Inability: Not on file    Transportation needs     Medical: Not on file     Non-medical: Not on file   Tobacco Use    Smoking status: Current Every Day Smoker     Packs/day: 1.00    Smokeless tobacco: Never Used   Substance and Sexual Activity    Alcohol use: Not on file    Drug use: Not on file    Sexual activity: Not on file   Lifestyle    Physical activity     Days per week: Not on file     Minutes per session: Not on file    Stress: Not on file   Relationships    Social connections     Talks on phone: Not on file     Gets together: Not on file     Attends Voodoo service: Not on file      Active member of club or organization: Not on file     Attends meetings of clubs or organizations: Not on file     Relationship status: Not on file   Other Topics Concern    Not on file   Social History Narrative    Not on file       Current Outpatient Medications on File Prior to Visit   Medication Sig Dispense Refill    aspirin (ECOTRIN) 81 MG EC tablet Take 81 mg by mouth once daily.      bumetanide (BUMEX) 1 MG tablet Take 1 mg by mouth once a week.       cholecalciferol, vitamin D3, 2,000 unit Tab Take by mouth once daily.      diltiaZEM (CARDIZEM CD) 240 MG 24 hr capsule Take 1 capsule (240 mg total) by mouth once daily. 90 capsule 3    diltiaZEM (CARDIZEM) 60 MG tablet Take 1 tablet (60 mg total) by mouth every evening. 90 tablet 3    DIPHENHYDRAMINE HCL (BENADRYL ORAL) Take by mouth.      docusate sodium (COLACE) 100 MG capsule Take 100 mg by mouth 2 (two) times daily.      fish oil-omega-3 fatty acids 300-1,000 mg capsule Take 2 g by mouth 2 (two) times daily.      gabapentin (NEURONTIN) 300 MG capsule Take 300 mg by mouth 2 (two) times daily.       glimepiride (AMARYL) 1 MG tablet Take 1 mg by mouth once daily.       hydroCHLOROthiazide (HYDRODIURIL) 25 MG tablet       hydroCHLOROthiazide (MICROZIDE) 12.5 mg capsule Take 12.5 mg by mouth once daily.      losartan (COZAAR) 50 MG tablet Take 50 mg by mouth once daily.      metformin (GLUCOPHAGE) 500 MG tablet Take 500 mg by mouth 2 (two) times daily with meals.       ondansetron (ZOFRAN) 4 MG tablet       potassium chloride (MICRO-K) 10 MEQ CpSR 10 mEq 2 (two) times daily.       PROAIR HFA 90 mcg/actuation inhaler       tizanidine (ZANAFLEX) 2 MG tablet Take 2 mg by mouth every 8 (eight) hours.       oxybutynin (DITROPAN-XL) 5 MG TR24 5 mg 2 (two) times daily.       [DISCONTINUED] clonazePAM (KLONOPIN) 0.5 MG tablet Take 0.5 mg by mouth once daily.       [DISCONTINUED] guaifenesin-codeine 100-10 mg/5 ml (TUSSI-ORGANIDIN NR)  mg/5  "mL syrup Take 5 mLs by mouth 3 (three) times daily as needed for Cough.      [DISCONTINUED] HYDROcodone-acetaminophen (NORCO)  mg per tablet        No current facility-administered medications on file prior to visit.        Review of patient's allergies indicates:   Allergen Reactions    Keflex [cephalexin]      Objective:     Vitals:    12/17/20 1322   BP: 130/70   BP Location: Left arm   Patient Position: Sitting   BP Method: Large (Automatic)   Pulse: 72   SpO2: (!) 93%   Weight: 78.1 kg (172 lb 1.3 oz)   Height: 5' 3" (1.6 m)        Physical Exam   Constitutional: She is oriented to person, place, and time. She appears well-developed and well-nourished.   Eyes: No scleral icterus.   Neck: No JVD present. Carotid bruit is not present.   Cardiovascular: Normal rate. An irregularly irregular rhythm present. Exam reveals no gallop.   No murmur heard.  Pulmonary/Chest: She has wheezes.   Musculoskeletal:         General: Edema (trace pitting pedal edema bilaterally) present.   Neurological: She is alert and oriented to person, place, and time.   Skin: Skin is warm and dry.   Psychiatric: She has a normal mood and affect. Her behavior is normal. Judgment and thought content normal.   Vitals reviewed.   pt has extensive bilateral wheezes both insiratory and expiratory      ECG: atrial fibrillation with a controlled ventricular response, nonspecific STT abnormality, abnormal R wave progression in precordial leads, reviewed by me, similar to old tracings      Recent lab7/24/20:  hgba1c 7.2  b12 and folate WNL  Lipids WNL  LDL 69, HDL 74  TSH 2.26  Lhgb 15.2  WBC 11.4    Assessment:     1. Chronic atrial fibrillation    2. Essential hypertension    3. Hypertensive heart disease without heart failure    4. Chronic bronchitis, unspecified chronic bronchitis type    5. Localized edema    6. Type 2 diabetes mellitus without complication, without long-term current use of insulin    7. Chronic midline low back pain with " sciatica, sciatica laterality unspecified    8. Stress incontinence of urine      Plan:   Teresita was seen today for follow-up.    Diagnoses and all orders for this visit:    Chronic atrial fibrillation  -     IN OFFICE EKG 12-LEAD (to Muse)  -     Comprehensive Metabolic Panel; Future  -     Magnesium; Future    Essential hypertension  -     Comprehensive Metabolic Panel; Future  -     Magnesium; Future    Hypertensive heart disease without heart failure    Chronic bronchitis, unspecified chronic bronchitis type    Localized edema    Type 2 diabetes mellitus without complication, without long-term current use of insulin  -     Comprehensive Metabolic Panel; Future  -     Magnesium; Future    Chronic midline low back pain with sciatica, sciatica laterality unspecified  -     Ambulatory referral/consult to [facility]; Future    Stress incontinence of urine  -     Ambulatory referral/consult to Urology; Future     Check CMP, Mg    Same meds    Smoking cessation counseled    Low carb diet    Consider PT    Consult pain management     Consult urology for incontinence    Pt declines Eliquis    Follow up in about 6 months (around 6/17/2021).

## 2020-12-18 ENCOUNTER — TELEPHONE (OUTPATIENT)
Dept: CARDIOLOGY | Facility: CLINIC | Age: 75
End: 2020-12-18

## 2020-12-18 NOTE — TELEPHONE ENCOUNTER
Called to speak to Brigette she was gone for the day. Tried to get some information about this message nobody knew what I was talking about. Got transferred about 3 times. I guess she will get in touch with the patient.        ----- Message from Shania Anders sent at 12/17/2020  4:54 PM CST -----  Contact: Brigette with [facility], [phone]  Brigette with [email or web address]  Ph [phone]    Calling to inform that she need a Recollect for CMP and Magnesium.  States that it is grossly hemolyzed.

## 2020-12-30 ENCOUNTER — OFFICE VISIT (OUTPATIENT)
Dept: PAIN MEDICINE | Facility: CLINIC | Age: 75
End: 2020-12-30
Payer: MEDICARE

## 2020-12-30 VITALS
HEIGHT: 63 IN | DIASTOLIC BLOOD PRESSURE: 81 MMHG | HEART RATE: 90 BPM | BODY MASS INDEX: 30.48 KG/M2 | RESPIRATION RATE: 18 BRPM | SYSTOLIC BLOOD PRESSURE: 149 MMHG | WEIGHT: 172 LBS | OXYGEN SATURATION: 100 % | TEMPERATURE: 98 F

## 2020-12-30 DIAGNOSIS — G89.4 CHRONIC PAIN SYNDROME: ICD-10-CM

## 2020-12-30 DIAGNOSIS — M54.40 CHRONIC MIDLINE LOW BACK PAIN WITH SCIATICA, SCIATICA LATERALITY UNSPECIFIED: ICD-10-CM

## 2020-12-30 DIAGNOSIS — M96.1 POSTLAMINECTOMY SYNDROME OF LUMBAR REGION: Primary | ICD-10-CM

## 2020-12-30 DIAGNOSIS — G89.29 CHRONIC MIDLINE LOW BACK PAIN WITH SCIATICA, SCIATICA LATERALITY UNSPECIFIED: ICD-10-CM

## 2020-12-30 PROCEDURE — 99999 PR PBB SHADOW E&M-EST. PATIENT-LVL V: CPT | Mod: PBBFAC,,, | Performed by: ANESTHESIOLOGY

## 2020-12-30 PROCEDURE — 1159F PR MEDICATION LIST DOCUMENTED IN MEDICAL RECORD: ICD-10-PCS | Mod: S$GLB,,, | Performed by: ANESTHESIOLOGY

## 2020-12-30 PROCEDURE — 99999 PR PBB SHADOW E&M-EST. PATIENT-LVL V: ICD-10-PCS | Mod: PBBFAC,,, | Performed by: ANESTHESIOLOGY

## 2020-12-30 PROCEDURE — 3079F PR MOST RECENT DIASTOLIC BLOOD PRESSURE 80-89 MM HG: ICD-10-PCS | Mod: CPTII,S$GLB,, | Performed by: ANESTHESIOLOGY

## 2020-12-30 PROCEDURE — 3008F PR BODY MASS INDEX (BMI) DOCUMENTED: ICD-10-PCS | Mod: CPTII,S$GLB,, | Performed by: ANESTHESIOLOGY

## 2020-12-30 PROCEDURE — 3288F PR FALLS RISK ASSESSMENT DOCUMENTED: ICD-10-PCS | Mod: CPTII,S$GLB,, | Performed by: ANESTHESIOLOGY

## 2020-12-30 PROCEDURE — 3288F FALL RISK ASSESSMENT DOCD: CPT | Mod: CPTII,S$GLB,, | Performed by: ANESTHESIOLOGY

## 2020-12-30 PROCEDURE — 3077F SYST BP >= 140 MM HG: CPT | Mod: CPTII,S$GLB,, | Performed by: ANESTHESIOLOGY

## 2020-12-30 PROCEDURE — 1125F PR PAIN SEVERITY QUANTIFIED, PAIN PRESENT: ICD-10-PCS | Mod: S$GLB,,, | Performed by: ANESTHESIOLOGY

## 2020-12-30 PROCEDURE — 1159F MED LIST DOCD IN RCRD: CPT | Mod: S$GLB,,, | Performed by: ANESTHESIOLOGY

## 2020-12-30 PROCEDURE — 99204 PR OFFICE/OUTPT VISIT, NEW, LEVL IV, 45-59 MIN: ICD-10-PCS | Mod: GC,S$GLB,, | Performed by: ANESTHESIOLOGY

## 2020-12-30 PROCEDURE — 3077F PR MOST RECENT SYSTOLIC BLOOD PRESSURE >= 140 MM HG: ICD-10-PCS | Mod: CPTII,S$GLB,, | Performed by: ANESTHESIOLOGY

## 2020-12-30 PROCEDURE — 3008F BODY MASS INDEX DOCD: CPT | Mod: CPTII,S$GLB,, | Performed by: ANESTHESIOLOGY

## 2020-12-30 PROCEDURE — 3079F DIAST BP 80-89 MM HG: CPT | Mod: CPTII,S$GLB,, | Performed by: ANESTHESIOLOGY

## 2020-12-30 PROCEDURE — 99204 OFFICE O/P NEW MOD 45 MIN: CPT | Mod: GC,S$GLB,, | Performed by: ANESTHESIOLOGY

## 2020-12-30 PROCEDURE — 1125F AMNT PAIN NOTED PAIN PRSNT: CPT | Mod: S$GLB,,, | Performed by: ANESTHESIOLOGY

## 2020-12-30 PROCEDURE — 1100F PR PT FALLS ASSESS DOC 2+ FALLS/FALL W/INJURY/YR: ICD-10-PCS | Mod: CPTII,S$GLB,, | Performed by: ANESTHESIOLOGY

## 2020-12-30 PROCEDURE — 1100F PTFALLS ASSESS-DOCD GE2>/YR: CPT | Mod: CPTII,S$GLB,, | Performed by: ANESTHESIOLOGY

## 2020-12-30 NOTE — LETTER
December 30, 2020      Victor Manuel Gross MD  [address]  [address]  [facility] [address]           [facility]  [address]  [facility] [address]  Phone: [phone]  Fax: [phone]          Patient: Teresita Hodges   MR Number: [id number]   YOB: 1945   Date of Visit: 12/30/2020       Dear Dr. Victor Manuel Gross:    Thank you for referring Teresita Hodges to me for evaluation. Attached you will find relevant portions of my assessment and plan of care.    If you have questions, please do not hesitate to call me. I look forward to following Teresita Hodges along with you.    Sincerely,    Herber Josue MD    Enclosure  CC:  No Recipients    If you would like to receive this communication electronically, please contact [email or web address] or [phone] to request more information on [employer] access.    For providers and/or their staff who would like to refer a patient to [facility], please contact us through our one-stop-shop provider referral line, [facility], at [phone].    If you feel you have received this communication in error or would no longer like to receive these types of communications, please e-mail [email or web address]

## 2020-12-30 NOTE — PROGRESS NOTES
"Chronic Pain - New Consult    Referring Physician: Victor Manuel Gross MD    Chief Complaint: No chief complaint on file.       SUBJECTIVE: Disclaimer: This note has been generated using voice-recognition software. There may be typographical errors that have been missed during proof-reading    Last 3 PDI Scores 12/30/2020   Pain Disability Index (PDI) 70       Initial encounter:    Teresita Hodges presents to the clinic for the evaluation of low back pain. The pain started 20 years ago following no specific incident and symptoms have been worsening.    Brief history: Patient is a 75 year old female with long standing history of low back pain s/p laminectomy in 1997 followed by a second ?washout operation and finally a lumbar fusion in 2010. Her back pain persists despite these procedures. She was seeing Dr. Phillips with pain management before presenting to our clinic to establish care. She previously received back injections though she is unsure exactly what they were. She has fallen 3 times since her surgery in 1997, most recently one week ago where she felt like her entire back just "gave out." She did not seek medical care after the fall.     Pain Description:    The pain is located in the low back area and radiates bilaterally down the back of both thighs, behind the knees, down the leg and into the toes sparing the sole of the feet.    At BEST  4/10     At WORST  10/10 on the WORST day.      On average pain is rated as 7/10.     Today the pain is rated as 10/10    The pain is described as aching, burning and dull      Symptoms interfere with daily activity and sleeping.     Exacerbating factors: Bending, Walking, Extension and Getting out of bed/chair.      Mitigating factors rest.     Patient denies night fever/night sweats, urinary incontinence, bowel incontinence and significant weight loss.  Patient denies any suicidal or homicidal ideations    Pain Medications:  Current:  Gabapentin 300 mg " BID  Tizanidine 2 mg q6h    Tried in Past:  NSAIDs -Never  TCA -Never  SNRI -Never  Anti-convulsants -Never  Muscle Relaxants -Never  Opioids - yes, chronic opioids, none for the last 4 months was being weaned from opioids by previous pain physician  Benzodiazepines -Never    Physical Therapy/Home Exercise: yes in the past, not currently     report:  Reviewed and consistent with medication use as prescribed.    Pain Procedures:   Unclear, awaiting release of information     Chiropractor -never  Acupuncture - never  TENS unit -never  Spinal decompression -3 prior lumbar fusions  Joint replacement -never    Imaging: Pending new updated imaging    XR Lumbar Spine 2 or 3 VW10/7/2019  [facility]  Result Impression   Degenerative and postsurgical changes    Electronically Signed By: Vu Ortega MD 10/7/2019 2:41 PM CDT   Result Narrative   CLINICAL HISTORY: Low back pain    FINDINGS: There are 5 lumbar segments with diffuse advanced degenerative findings    Posterior pedicle screw fixation noted involving L4 and L5 with anatomic position and alignment    Vacuum disc finding seen at L2-L3    No acute finding or fracture   Other Result Information   [facility], Rad Results In - 10/07/2019  2:43 PM CDT  CLINICAL HISTORY: Low back pain    FINDINGS: There are 5 lumbar segments with diffuse advanced degenerative findings    Posterior pedicle screw fixation noted involving L4 and L5 with anatomic position and alignment    Vacuum disc finding seen at L2-L3    No acute finding or fracture    IMPRESSION:   Degenerative and postsurgical changes    Electronically Signed By: Vu Ortega MD 10/7/2019 2:41 PM CDT       XR HIP 2+ VW ea Bilateral with Pelvis5/23/2020  [facility]  Result Impression   1. Degenerative change involving both hips, especially the right.  2. Thin curvilinear lucency over the medial aspect of the proximal right femur at the base of the femoral head is unchanged from the prior study and therefore  unlikely represent an acute fracture.  3. Degenerative change in lower lumbar spine.  4. Atherosclerotic vascular disease.  5. Gas and fecal matter obscure the sacrum.    Consider CT scan of the pelvis and hips for further evaluation.    Electronically Signed By: Seth Lara MD 5/23/2020 7:44 AM CDT   Result Narrative     BILATERAL HIPS WITH AP PELVIS:    CPT CODE: 42719    INDICATION: pain    COMPARISON STUDY: AP pelvis and bilateral hip x-rays of 10/7/2019    FINDINGS: Views of both hips were obtained along with an AP pelvis. The hip joints are anatomically aligned. There is degenerative change involving both hips, especially the right. There is a thin curvilinear lucency along the medial aspect of the base of the right femoral head that is nonspecific but stable compared to the prior study and therefore, unlikely represent acute fracture.    There is no diastases of the symphysis pubis or either sacroiliac joint. Gas and fecal matter obscure the body of the sacrum. Postoperative changes are seen to involve the lower lumbar spine and calcification is seen in the aortoiliac, and femoral arteries.   Other Result Information   [facility], Rad Results In - 05/23/2020  7:46 AM CDT    BILATERAL HIPS WITH AP PELVIS:    CPT CODE: 73042    INDICATION: pain    COMPARISON STUDY: AP pelvis and bilateral hip x-rays of 10/7/2019    FINDINGS: Views of both hips were obtained along with an AP pelvis. The hip joints are anatomically aligned. There is degenerative change involving both hips, especially the right. There is a thin curvilinear lucency along the medial aspect of the base of the right femoral head that is nonspecific but stable compared to the prior study and therefore, unlikely represent acute fracture.    There is no diastases of the symphysis pubis or either sacroiliac joint. Gas and fecal matter obscure the body of the sacrum. Postoperative changes are seen to involve the lower lumbar spine and calcification is seen in  the aortoiliac, and femoral arteries.    IMPRESSION:   1. Degenerative change involving both hips, especially the right.  2. Thin curvilinear lucency over the medial aspect of the proximal right femur at the base of the femoral head is unchanged from the prior study and therefore unlikely represent an acute fracture.  3. Degenerative change in lower lumbar spine.  4. Atherosclerotic vascular disease.  5. Gas and fecal matter obscure the sacrum.    Consider CT scan of the pelvis and hips for further evaluation.    Electronically Signed By: Seth Lara MD 5/23/2020 7:44 AM CDT           Past Medical History:   Diagnosis Date    Atrial fibrillation     COPD (chronic obstructive pulmonary disease)     Diabetes mellitus     HCVD (hypertensive cardiovascular disease)     Hyperlipidemia     Hypernatremia     Hypertension     Insomnia     Restless leg syndrome      No past surgical history on file.  Social History     Socioeconomic History    Marital status:      Spouse name: Not on file    Number of children: Not on file    Years of education: Not on file    Highest education level: Not on file   Occupational History    Not on file   Social Needs    Financial resource strain: Not on file    Food insecurity     Worry: Not on file     Inability: Not on file    Transportation needs     Medical: Not on file     Non-medical: Not on file   Tobacco Use    Smoking status: Current Every Day Smoker     Packs/day: 1.00    Smokeless tobacco: Never Used   Substance and Sexual Activity    Alcohol use: Not on file    Drug use: Not on file    Sexual activity: Not on file   Lifestyle    Physical activity     Days per week: Not on file     Minutes per session: Not on file    Stress: Not on file   Relationships    Social connections     Talks on phone: Not on file     Gets together: Not on file     Attends Tenriism service: Not on file     Active member of club or organization: Not on file     Attends  meetings of clubs or organizations: Not on file     Relationship status: Not on file   Other Topics Concern    Not on file   Social History Narrative    Not on file     No family history on file.    Review of patient's allergies indicates:   Allergen Reactions    Keflex [cephalexin]        Current Outpatient Medications   Medication Sig    aspirin (ECOTRIN) 81 MG EC tablet Take 81 mg by mouth once daily.    bumetanide (BUMEX) 1 MG tablet Take 1 mg by mouth once a week.     cholecalciferol, vitamin D3, 2,000 unit Tab Take by mouth once daily.    diltiaZEM (CARDIZEM CD) 240 MG 24 hr capsule Take 1 capsule (240 mg total) by mouth once daily.    diltiaZEM (CARDIZEM) 60 MG tablet Take 1 tablet (60 mg total) by mouth every evening.    DIPHENHYDRAMINE HCL (BENADRYL ORAL) Take by mouth.    docusate sodium (COLACE) 100 MG capsule Take 100 mg by mouth 2 (two) times daily.    fish oil-omega-3 fatty acids 300-1,000 mg capsule Take 2 g by mouth 2 (two) times daily.    gabapentin (NEURONTIN) 300 MG capsule Take 300 mg by mouth 2 (two) times daily.     glimepiride (AMARYL) 1 MG tablet Take 1 mg by mouth once daily.     hydroCHLOROthiazide (HYDRODIURIL) 25 MG tablet     hydroCHLOROthiazide (MICROZIDE) 12.5 mg capsule Take 12.5 mg by mouth once daily.    losartan (COZAAR) 50 MG tablet Take 50 mg by mouth once daily.    metformin (GLUCOPHAGE) 500 MG tablet Take 500 mg by mouth 2 (two) times daily with meals.     ondansetron (ZOFRAN) 4 MG tablet     potassium chloride (MICRO-K) 10 MEQ CpSR 10 mEq 2 (two) times daily.     PROAIR HFA 90 mcg/actuation inhaler     tizanidine (ZANAFLEX) 2 MG tablet Take 2 mg by mouth every 8 (eight) hours.     oxybutynin (DITROPAN-XL) 5 MG TR24 5 mg 2 (two) times daily.      No current facility-administered medications for this visit.        REVIEW OF SYSTEMS:    GENERAL:  No weight loss, malaise or fevers.  HEENT:   No recent changes in vision or hearing  NECK:  Negative for lumps,  "no difficulty with swallowing.  RESPIRATORY:  Negative for cough, wheezing or shortness of breath, patient denies any recent URI.  CARDIOVASCULAR:  Negative for chest pain, leg swelling or palpitations.  GI:  Negative for abdominal discomfort, blood in stools or black stools or change in bowel habits.  MUSCULOSKELETAL:  See HPI.   SKIN:  Negative for lesions, rash, and itching.  PSYCH:  No mood disorder or recent psychosocial stressors.  Patients sleep is not disturbed secondary to pain.  HEMATOLOGY/LYMPHOLOGY:  Negative for prolonged bleeding, bruising easily or swollen nodes.  Patient is not currently taking any anti-coagulants  ENDO: No history of diabetes or thyroid dysfunction  NEURO:   No history of headaches, syncope, paralysis, seizures or tremors.  All other reviewed and negative other than HPI.     OBJECTIVE:    BP (!) 149/81   Pulse 90   Temp 98 °F (36.7 °C)   Resp 18   Ht 5' 3" (1.6 m)   Wt 78 kg (172 lb)   SpO2 100%   BMI 30.47 kg/m²     PHYSICAL EXAMINATION:    GENERAL: Well appearing, in no acute distress, alert and oriented x3.  PSYCH:  Mood and affect appropriate.  SKIN: Skin color, texture, turgor normal, no rashes or lesions.  HEAD/FACE:  Normocephalic, atraumatic. Cranial nerves grossly intact.  NECK: No pain to palpation over the cervical paraspinous muscles. Spurling Negative. No pain with neck flexion, extension, or lateral flexion.   CV: RRR with palpation of the radial artery.  PULM: No evidence of respiratory difficulty, symmetric chest rise.  GI:  Soft and non-tender.  BACK: No pain to palpation over the facet joints of the lumbar spine or spinous processes. +pain with back extension, +SLR bilaterally at 45 degrees in supine position with radicular pain  EXTREMITIES: Peripheral joint ROM is full and pain free without obvious instability or laxity in all four extremities. No deformities, edema, or skin discoloration. Good capillary refill. +pain with right hip flexion, limited " ROM  MUSCULOSKELETAL: Shoulder, hip, and knee provocative maneuvers are negative.  There is no pain with palpation over the sacroiliac joints bilaterally.  FABERs test is negative.  FADIRs test is negative.   Bilateral upper extremity strength is normal and symmetric.  No atrophy or tone abnormalities are noted. +Strength 4/5 in RLE particularly proximal, 5/5 LLE  NEURO: Bilateral upper and lower extremity coordination and muscle stretch reflexes are physiologic and symmetric.  Plantar response are downgoing. No clonus.  No loss of sensation is noted.   GAIT: normal.    Lab Results   Component Value Date    WBC 12.87 (H) 07/16/2009    HGB 10.0 (L) 07/20/2009    HCT 30.4 (L) 07/20/2009    MCV 97.8 (H) 07/16/2009     07/16/2009       BMP  Lab Results   Component Value Date     02/05/2007    K 3.0 (L) 02/05/2007     02/05/2007    CO2 28 02/05/2007    BUN 15 02/05/2007    CREATININE 0.9 02/05/2007    CALCIUM 9.8 02/05/2007         ASSESSMENT: 75 y.o. year old female with pain, consistent with     Encounter Diagnoses   Name Primary?    Chronic midline low back pain with sciatica, sciatica laterality unspecified     Postlaminectomy syndrome of lumbar region Yes    Chronic pain syndrome        PLAN:     - Order Xrays of hip bilateral 2 views    - Order Xrays of lumbar spine, flexion and extension    - Order MRI of lumbar spine with and without contrast    - Order CMP to check for kidney function    - Order EMG of bilateral lower extremities    - Obtain records from previous pain management clinic    - Schedule for caudal WINDY    - Briefly discussed SCS as a future option, if surgery not indicated and no improvement from epidural injections    - RTC 2 weeks after procedure    -Additionally in the future the patient may benefit from mbb to be followed by RFA if diagnostic        The above plan and management options were discussed at length with patient. Patient is in agreement with the above and  verbalized understanding. It will be communicated with the referring physician via electronic record, fax, or mail.    Maurisio Kc  12/30/2020      I reviewed and edited the  resident's note, I conducted my own interview and physical examination and agree with the findings.     Herber Josue  12/30/2020

## 2021-01-06 ENCOUNTER — INITIAL CONSULT (OUTPATIENT)
Dept: PAIN MEDICINE | Facility: OTHER | Age: 76
End: 2021-01-06
Payer: MEDICARE

## 2021-01-06 ENCOUNTER — LAB VISIT (OUTPATIENT)
Dept: LAB | Facility: OTHER | Age: 76
End: 2021-01-06
Attending: ANESTHESIOLOGY
Payer: MEDICARE

## 2021-01-06 DIAGNOSIS — Z74.09 IMPAIRED FUNCTIONAL MOBILITY, BALANCE, GAIT, AND ENDURANCE: ICD-10-CM

## 2021-01-06 DIAGNOSIS — G89.4 CHRONIC PAIN DISORDER: Primary | ICD-10-CM

## 2021-01-06 DIAGNOSIS — G89.29 CHRONIC MIDLINE LOW BACK PAIN WITH SCIATICA, SCIATICA LATERALITY UNSPECIFIED: ICD-10-CM

## 2021-01-06 DIAGNOSIS — M54.40 CHRONIC MIDLINE LOW BACK PAIN WITH SCIATICA, SCIATICA LATERALITY UNSPECIFIED: ICD-10-CM

## 2021-01-06 DIAGNOSIS — M54.6 ACUTE MIDLINE THORACIC BACK PAIN: ICD-10-CM

## 2021-01-06 DIAGNOSIS — Z72.820 POOR SLEEP: ICD-10-CM

## 2021-01-06 DIAGNOSIS — Z78.9 DECREASED ACTIVITIES OF DAILY LIVING (ADL): ICD-10-CM

## 2021-01-06 DIAGNOSIS — M96.1 POSTLAMINECTOMY SYNDROME OF LUMBAR REGION: ICD-10-CM

## 2021-01-06 DIAGNOSIS — G89.4 CHRONIC PAIN SYNDROME: ICD-10-CM
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
## 2021-01-06 LAB
ALBUMIN SERPL BCP-MCNC: 3.6 G/DL (ref 3.5–5.2)
ALP SERPL-CCNC: 204 U/L (ref 55–135)
ALT SERPL W/O P-5'-P-CCNC: 28 U/L (ref 10–44)
ANION GAP SERPL CALC-SCNC: 12 MMOL/L (ref 8–16)
AST SERPL-CCNC: 27 U/L (ref 10–40)
BILIRUB SERPL-MCNC: 0.7 MG/DL (ref 0.1–1)
BUN SERPL-MCNC: 9 MG/DL (ref 8–23)
CALCIUM SERPL-MCNC: 9.3 MG/DL (ref 8.7–10.5)
CHLORIDE SERPL-SCNC: 93 MMOL/L (ref 95–110)
CO2 SERPL-SCNC: 28 MMOL/L (ref 23–29)
CREAT SERPL-MCNC: 0.8 MG/DL (ref 0.5–1.4)
EST. GFR  (AFRICAN AMERICAN): >60 ML/MIN/1.73 M^2
EST. GFR  (NON AFRICAN AMERICAN): >60 ML/MIN/1.73 M^2
GLUCOSE SERPL-MCNC: 318 MG/DL (ref 70–110)
POTASSIUM SERPL-SCNC: 3.9 MMOL/L (ref 3.5–5.1)
PROT SERPL-MCNC: 7.8 G/DL (ref 6–8.4)
SODIUM SERPL-SCNC: 133 MMOL/L (ref 136–145)

## 2021-01-06 PROCEDURE — 99215 OFFICE O/P EST HI 40 MIN: CPT | Mod: ,,, | Performed by: NURSE PRACTITIONER

## 2021-01-06 PROCEDURE — 80053 COMPREHEN METABOLIC PANEL: CPT

## 2021-01-06 PROCEDURE — 36415 COLL VENOUS BLD VENIPUNCTURE: CPT

## 2021-01-06 PROCEDURE — 1159F PR MEDICATION LIST DOCUMENTED IN MEDICAL RECORD: ICD-10-PCS | Mod: ,,, | Performed by: NURSE PRACTITIONER

## 2021-01-06 PROCEDURE — 99215 PR OFFICE/OUTPT VISIT, EST, LEVL V, 40-54 MIN: ICD-10-PCS | Mod: ,,, | Performed by: NURSE PRACTITIONER

## 2021-01-06 PROCEDURE — 1159F MED LIST DOCD IN RCRD: CPT | Mod: ,,, | Performed by: NURSE PRACTITIONER

## 2021-01-07 DIAGNOSIS — M54.6 ACUTE MIDLINE THORACIC BACK PAIN: Primary | ICD-10-CM
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
## 2021-01-08 ENCOUNTER — HOSPITAL ENCOUNTER (OUTPATIENT)
Dept: RADIOLOGY | Facility: OTHER | Age: 76
Discharge: HOME | End: 2021-01-08
Attending: ANESTHESIOLOGY
Payer: MEDICARE

## 2021-01-08 DIAGNOSIS — M54.40 CHRONIC MIDLINE LOW BACK PAIN WITH SCIATICA, SCIATICA LATERALITY UNSPECIFIED: ICD-10-CM

## 2021-01-08 DIAGNOSIS — G89.4 CHRONIC PAIN SYNDROME: ICD-10-CM

## 2021-01-08 DIAGNOSIS — M96.1 POSTLAMINECTOMY SYNDROME OF LUMBAR REGION: ICD-10-CM

## 2021-01-08 DIAGNOSIS — G89.29 CHRONIC MIDLINE LOW BACK PAIN WITH SCIATICA, SCIATICA LATERALITY UNSPECIFIED: ICD-10-CM

## 2021-01-08 PROCEDURE — 73521 XR HIPS BILATERAL 2 VIEW INCL AP PELVIS: ICD-10-PCS | Mod: 26,,, | Performed by: INTERNAL MEDICINE

## 2021-01-08 PROCEDURE — 72110 XR LUMBAR SPINE 5 VIEW WITH FLEX AND EXT: ICD-10-PCS | Mod: 26,,, | Performed by: INTERNAL MEDICINE

## 2021-01-08 PROCEDURE — 72110 X-RAY EXAM L-2 SPINE 4/>VWS: CPT | Mod: TC,FY

## 2021-01-08 PROCEDURE — 73521 X-RAY EXAM HIPS BI 2 VIEWS: CPT | Mod: TC,FY

## 2021-01-08 PROCEDURE — 72110 X-RAY EXAM L-2 SPINE 4/>VWS: CPT | Mod: 26,,, | Performed by: INTERNAL MEDICINE

## 2021-01-08 PROCEDURE — 73521 X-RAY EXAM HIPS BI 2 VIEWS: CPT | Mod: 26,,, | Performed by: INTERNAL MEDICINE

## 2021-01-11 ENCOUNTER — TELEPHONE (OUTPATIENT)
Dept: PAIN MEDICINE | Facility: CLINIC | Age: 76
End: 2021-01-11

## 2021-01-19 ENCOUNTER — OFFICE VISIT (OUTPATIENT)
Dept: UROLOGY | Facility: CLINIC | Age: 76
End: 2021-01-19
Payer: MEDICARE

## 2021-01-19 VITALS
DIASTOLIC BLOOD PRESSURE: 66 MMHG | SYSTOLIC BLOOD PRESSURE: 146 MMHG | WEIGHT: 172 LBS | HEART RATE: 80 BPM | BODY MASS INDEX: 30.47 KG/M2

## 2021-01-19 DIAGNOSIS — N39.41 URGENCY INCONTINENCE: Primary | ICD-10-CM

## 2021-01-19 DIAGNOSIS — N39.3 STRESS INCONTINENCE OF URINE: ICD-10-CM

## 2021-01-19 PROCEDURE — 1100F PTFALLS ASSESS-DOCD GE2>/YR: CPT | Mod: CPTII,S$GLB,, | Performed by: UROLOGY

## 2021-01-19 PROCEDURE — 1100F PR PT FALLS ASSESS DOC 2+ FALLS/FALL W/INJURY/YR: ICD-10-PCS | Mod: CPTII,S$GLB,, | Performed by: UROLOGY

## 2021-01-19 PROCEDURE — 99204 OFFICE O/P NEW MOD 45 MIN: CPT | Mod: S$GLB,,, | Performed by: UROLOGY

## 2021-01-19 PROCEDURE — 3078F PR MOST RECENT DIASTOLIC BLOOD PRESSURE < 80 MM HG: ICD-10-PCS | Mod: CPTII,S$GLB,, | Performed by: UROLOGY

## 2021-01-19 PROCEDURE — 3077F SYST BP >= 140 MM HG: CPT | Mod: CPTII,S$GLB,, | Performed by: UROLOGY

## 2021-01-19 PROCEDURE — 3288F FALL RISK ASSESSMENT DOCD: CPT | Mod: CPTII,S$GLB,, | Performed by: UROLOGY

## 2021-01-19 PROCEDURE — 99204 PR OFFICE/OUTPT VISIT, NEW, LEVL IV, 45-59 MIN: ICD-10-PCS | Mod: S$GLB,,, | Performed by: UROLOGY

## 2021-01-19 PROCEDURE — 1126F PR PAIN SEVERITY QUANTIFIED, NO PAIN PRESENT: ICD-10-PCS | Mod: S$GLB,,, | Performed by: UROLOGY

## 2021-01-19 PROCEDURE — 1159F PR MEDICATION LIST DOCUMENTED IN MEDICAL RECORD: ICD-10-PCS | Mod: S$GLB,,, | Performed by: UROLOGY

## 2021-01-19 PROCEDURE — 1126F AMNT PAIN NOTED NONE PRSNT: CPT | Mod: S$GLB,,, | Performed by: UROLOGY

## 2021-01-19 PROCEDURE — 3008F BODY MASS INDEX DOCD: CPT | Mod: CPTII,S$GLB,, | Performed by: UROLOGY

## 2021-01-19 PROCEDURE — 3288F PR FALLS RISK ASSESSMENT DOCUMENTED: ICD-10-PCS | Mod: CPTII,S$GLB,, | Performed by: UROLOGY

## 2021-01-19 PROCEDURE — 3008F PR BODY MASS INDEX (BMI) DOCUMENTED: ICD-10-PCS | Mod: CPTII,S$GLB,, | Performed by: UROLOGY

## 2021-01-19 PROCEDURE — 3077F PR MOST RECENT SYSTOLIC BLOOD PRESSURE >= 140 MM HG: ICD-10-PCS | Mod: CPTII,S$GLB,, | Performed by: UROLOGY

## 2021-01-19 PROCEDURE — 3078F DIAST BP <80 MM HG: CPT | Mod: CPTII,S$GLB,, | Performed by: UROLOGY

## 2021-01-19 PROCEDURE — 1159F MED LIST DOCD IN RCRD: CPT | Mod: S$GLB,,, | Performed by: UROLOGY

## 2021-01-19 RX ORDER — GLIMEPIRIDE 2 MG/1
TABLET ORAL
COMMUNITY
Start: 2020-11-13 | End: 2021-06-07

## 2021-01-19 RX ORDER — NORTRIPTYLINE HYDROCHLORIDE 10 MG/1
10 CAPSULE ORAL NIGHTLY
COMMUNITY
Start: 2020-11-24 | End: 2021-06-02

## 2021-01-19 RX ORDER — ESTRADIOL 0.1 MG/G
1 CREAM VAGINAL
Qty: 8 G | Refills: 11 | Status: SHIPPED | OUTPATIENT
Start: 2021-01-21 | End: 2021-06-02

## 2021-01-19 RX ORDER — FLUTICASONE PROPIONATE 50 MCG
SPRAY, SUSPENSION (ML) NASAL
COMMUNITY
Start: 2020-12-01

## 2021-01-21 ENCOUNTER — TELEPHONE (OUTPATIENT)
Dept: PAIN MEDICINE | Facility: CLINIC | Age: 76
End: 2021-01-21

## 2021-01-21 ENCOUNTER — TELEPHONE (OUTPATIENT)
Dept: CARDIOLOGY | Facility: CLINIC | Age: 76
End: 2021-01-21

## 2021-01-28 ENCOUNTER — TELEPHONE (OUTPATIENT)
Dept: CARDIOLOGY | Facility: CLINIC | Age: 76
End: 2021-01-28

## 2021-01-29 ENCOUNTER — OFFICE VISIT (OUTPATIENT)
Dept: UROLOGY | Facility: CLINIC | Age: 76
End: 2021-01-29
Payer: MEDICARE

## 2021-01-29 VITALS
HEART RATE: 83 BPM | BODY MASS INDEX: 30.48 KG/M2 | SYSTOLIC BLOOD PRESSURE: 156 MMHG | WEIGHT: 172 LBS | HEIGHT: 63 IN | DIASTOLIC BLOOD PRESSURE: 67 MMHG

## 2021-01-29 DIAGNOSIS — N32.81 OAB (OVERACTIVE BLADDER): Primary | ICD-10-CM

## 2021-01-29 DIAGNOSIS — N39.41 URGENCY INCONTINENCE: ICD-10-CM

## 2021-01-29 PROCEDURE — 3078F PR MOST RECENT DIASTOLIC BLOOD PRESSURE < 80 MM HG: ICD-10-PCS | Mod: CPTII,S$GLB,, | Performed by: UROLOGY

## 2021-01-29 PROCEDURE — 3077F SYST BP >= 140 MM HG: CPT | Mod: CPTII,S$GLB,, | Performed by: UROLOGY

## 2021-01-29 PROCEDURE — 3078F DIAST BP <80 MM HG: CPT | Mod: CPTII,S$GLB,, | Performed by: UROLOGY

## 2021-01-29 PROCEDURE — 3288F PR FALLS RISK ASSESSMENT DOCUMENTED: ICD-10-PCS | Mod: CPTII,S$GLB,, | Performed by: UROLOGY

## 2021-01-29 PROCEDURE — 1100F PR PT FALLS ASSESS DOC 2+ FALLS/FALL W/INJURY/YR: ICD-10-PCS | Mod: CPTII,S$GLB,, | Performed by: UROLOGY

## 2021-01-29 PROCEDURE — 3008F PR BODY MASS INDEX (BMI) DOCUMENTED: ICD-10-PCS | Mod: CPTII,S$GLB,, | Performed by: UROLOGY

## 2021-01-29 PROCEDURE — 1100F PTFALLS ASSESS-DOCD GE2>/YR: CPT | Mod: CPTII,S$GLB,, | Performed by: UROLOGY

## 2021-01-29 PROCEDURE — 1159F MED LIST DOCD IN RCRD: CPT | Mod: S$GLB,,, | Performed by: UROLOGY

## 2021-01-29 PROCEDURE — 3077F PR MOST RECENT SYSTOLIC BLOOD PRESSURE >= 140 MM HG: ICD-10-PCS | Mod: CPTII,S$GLB,, | Performed by: UROLOGY

## 2021-01-29 PROCEDURE — 1125F AMNT PAIN NOTED PAIN PRSNT: CPT | Mod: S$GLB,,, | Performed by: UROLOGY

## 2021-01-29 PROCEDURE — 99214 OFFICE O/P EST MOD 30 MIN: CPT | Mod: S$GLB,,, | Performed by: UROLOGY

## 2021-01-29 PROCEDURE — 3008F BODY MASS INDEX DOCD: CPT | Mod: CPTII,S$GLB,, | Performed by: UROLOGY

## 2021-01-29 PROCEDURE — 1159F PR MEDICATION LIST DOCUMENTED IN MEDICAL RECORD: ICD-10-PCS | Mod: S$GLB,,, | Performed by: UROLOGY

## 2021-01-29 PROCEDURE — 1125F PR PAIN SEVERITY QUANTIFIED, PAIN PRESENT: ICD-10-PCS | Mod: S$GLB,,, | Performed by: UROLOGY

## 2021-01-29 PROCEDURE — 99214 PR OFFICE/OUTPT VISIT, EST, LEVL IV, 30-39 MIN: ICD-10-PCS | Mod: S$GLB,,, | Performed by: UROLOGY

## 2021-01-29 PROCEDURE — 3288F FALL RISK ASSESSMENT DOCD: CPT | Mod: CPTII,S$GLB,, | Performed by: UROLOGY

## 2021-02-01 ENCOUNTER — PROCEDURE VISIT (OUTPATIENT)
Dept: PHYSICAL MEDICINE AND REHAB | Facility: CLINIC | Age: 76
End: 2021-02-01
Payer: MEDICARE

## 2021-02-01 DIAGNOSIS — M96.1 POSTLAMINECTOMY SYNDROME OF LUMBAR REGION: ICD-10-CM

## 2021-02-01 DIAGNOSIS — G89.29 CHRONIC MIDLINE LOW BACK PAIN WITH SCIATICA, SCIATICA LATERALITY UNSPECIFIED: ICD-10-CM

## 2021-02-01 DIAGNOSIS — M54.40 CHRONIC MIDLINE LOW BACK PAIN WITH SCIATICA, SCIATICA LATERALITY UNSPECIFIED: ICD-10-CM

## 2021-02-01 DIAGNOSIS — G89.4 CHRONIC PAIN SYNDROME: ICD-10-CM

## 2021-02-01 PROCEDURE — 95910 PR NERVE CONDUCTION STUDY; 7-8 STUDIES: ICD-10-PCS | Mod: S$GLB,,, | Performed by: PHYSICAL MEDICINE & REHABILITATION

## 2021-02-01 PROCEDURE — 95910 NRV CNDJ TEST 7-8 STUDIES: CPT | Mod: S$GLB,,, | Performed by: PHYSICAL MEDICINE & REHABILITATION

## 2021-02-01 PROCEDURE — 95886 PR EMG COMPLETE, W/ NERVE CONDUCTION STUDIES, 5+ MUSCLES: ICD-10-PCS | Mod: S$GLB,,, | Performed by: PHYSICAL MEDICINE & REHABILITATION

## 2021-02-01 PROCEDURE — 95886 MUSC TEST DONE W/N TEST COMP: CPT | Mod: S$GLB,,, | Performed by: PHYSICAL MEDICINE & REHABILITATION

## 2021-02-03 ENCOUNTER — TELEPHONE (OUTPATIENT)
Dept: PAIN MEDICINE | Facility: CLINIC | Age: 76
End: 2021-02-03

## 2021-02-03 RX ORDER — ALPRAZOLAM 0.5 MG/1
TABLET ORAL
Qty: 2 TABLET | Refills: 0 | Status: SHIPPED | OUTPATIENT
Start: 2021-02-03 | End: 2021-06-02

## 2021-02-08 ENCOUNTER — OFFICE VISIT (OUTPATIENT)
Dept: URGENT CARE | Facility: CLINIC | Age: 76
End: 2021-02-08
Payer: MEDICARE

## 2021-02-08 VITALS
OXYGEN SATURATION: 96 % | HEART RATE: 106 BPM | RESPIRATION RATE: 17 BRPM | SYSTOLIC BLOOD PRESSURE: 179 MMHG | DIASTOLIC BLOOD PRESSURE: 83 MMHG | TEMPERATURE: 99 F

## 2021-02-08 DIAGNOSIS — L23.9 ALLERGIC DERMATITIS: Primary | ICD-10-CM

## 2021-02-08 PROCEDURE — 99203 OFFICE O/P NEW LOW 30 MIN: CPT | Mod: S$GLB,,, | Performed by: FAMILY MEDICINE

## 2021-02-08 PROCEDURE — 99203 PR OFFICE/OUTPT VISIT, NEW, LEVL III, 30-44 MIN: ICD-10-PCS | Mod: S$GLB,,, | Performed by: FAMILY MEDICINE

## 2021-02-08 RX ORDER — PREDNISONE 20 MG/1
40 TABLET ORAL DAILY
Qty: 6 TABLET | Refills: 0 | Status: SHIPPED | OUTPATIENT
Start: 2021-02-08 | End: 2021-02-11

## 2021-02-08 RX ORDER — CLONAZEPAM 0.5 MG/1
0.5 TABLET ORAL 2 TIMES DAILY PRN
COMMUNITY
End: 2021-06-02

## 2021-02-08 RX ORDER — TRIAMCINOLONE ACETONIDE 1 MG/G
OINTMENT TOPICAL
COMMUNITY
Start: 2021-02-05

## 2021-05-18 ENCOUNTER — TELEPHONE (OUTPATIENT)
Dept: CARDIOLOGY | Facility: CLINIC | Age: 76
End: 2021-05-18

## 2021-06-02 ENCOUNTER — OFFICE VISIT (OUTPATIENT)
Dept: CARDIOLOGY | Facility: CLINIC | Age: 76
End: 2021-06-02
Payer: MEDICARE

## 2021-06-02 VITALS
WEIGHT: 174.94 LBS | OXYGEN SATURATION: 95 % | HEIGHT: 63 IN | DIASTOLIC BLOOD PRESSURE: 70 MMHG | SYSTOLIC BLOOD PRESSURE: 125 MMHG | BODY MASS INDEX: 31 KG/M2 | HEART RATE: 85 BPM

## 2021-06-02 DIAGNOSIS — M54.40 CHRONIC MIDLINE LOW BACK PAIN WITH SCIATICA, SCIATICA LATERALITY UNSPECIFIED: ICD-10-CM

## 2021-06-02 DIAGNOSIS — G89.29 CHRONIC MIDLINE LOW BACK PAIN WITH SCIATICA, SCIATICA LATERALITY UNSPECIFIED: ICD-10-CM

## 2021-06-02 DIAGNOSIS — R60.0 LOCALIZED EDEMA: ICD-10-CM

## 2021-06-02 DIAGNOSIS — I10 ESSENTIAL HYPERTENSION: ICD-10-CM

## 2021-06-02 DIAGNOSIS — I48.20 CHRONIC ATRIAL FIBRILLATION: Primary | ICD-10-CM

## 2021-06-02 DIAGNOSIS — I25.10 CORONARY ARTERY DISEASE INVOLVING NATIVE CORONARY ARTERY OF NATIVE HEART WITHOUT ANGINA PECTORIS: ICD-10-CM

## 2021-06-02 DIAGNOSIS — E11.9 TYPE 2 DIABETES MELLITUS WITHOUT COMPLICATION, WITHOUT LONG-TERM CURRENT USE OF INSULIN: ICD-10-CM

## 2021-06-02 DIAGNOSIS — I11.9 HYPERTENSIVE HEART DISEASE WITHOUT HEART FAILURE: ICD-10-CM

## 2021-06-02 PROCEDURE — 1159F MED LIST DOCD IN RCRD: CPT | Mod: S$GLB,,, | Performed by: INTERNAL MEDICINE

## 2021-06-02 PROCEDURE — 99999 PR PBB SHADOW E&M-EST. PATIENT-LVL IV: ICD-10-PCS | Mod: PBBFAC,,, | Performed by: INTERNAL MEDICINE

## 2021-06-02 PROCEDURE — 99999 PR PBB SHADOW E&M-EST. PATIENT-LVL IV: CPT | Mod: PBBFAC,,, | Performed by: INTERNAL MEDICINE

## 2021-06-02 PROCEDURE — 93000 EKG 12-LEAD: ICD-10-PCS | Mod: S$GLB,,, | Performed by: INTERNAL MEDICINE

## 2021-06-02 PROCEDURE — 93000 ELECTROCARDIOGRAM COMPLETE: CPT | Mod: S$GLB,,, | Performed by: INTERNAL MEDICINE

## 2021-06-02 PROCEDURE — 1125F AMNT PAIN NOTED PAIN PRSNT: CPT | Mod: S$GLB,,, | Performed by: INTERNAL MEDICINE

## 2021-06-02 PROCEDURE — 99214 OFFICE O/P EST MOD 30 MIN: CPT | Mod: 25,S$GLB,, | Performed by: INTERNAL MEDICINE

## 2021-06-02 PROCEDURE — 1125F PR PAIN SEVERITY QUANTIFIED, PAIN PRESENT: ICD-10-PCS | Mod: S$GLB,,, | Performed by: INTERNAL MEDICINE

## 2021-06-02 PROCEDURE — 1100F PTFALLS ASSESS-DOCD GE2>/YR: CPT | Mod: CPTII,S$GLB,, | Performed by: INTERNAL MEDICINE

## 2021-06-02 PROCEDURE — 1159F PR MEDICATION LIST DOCUMENTED IN MEDICAL RECORD: ICD-10-PCS | Mod: S$GLB,,, | Performed by: INTERNAL MEDICINE

## 2021-06-02 PROCEDURE — 3288F PR FALLS RISK ASSESSMENT DOCUMENTED: ICD-10-PCS | Mod: CPTII,S$GLB,, | Performed by: INTERNAL MEDICINE

## 2021-06-02 PROCEDURE — 3288F FALL RISK ASSESSMENT DOCD: CPT | Mod: CPTII,S$GLB,, | Performed by: INTERNAL MEDICINE

## 2021-06-02 PROCEDURE — 99214 PR OFFICE/OUTPT VISIT, EST, LEVL IV, 30-39 MIN: ICD-10-PCS | Mod: 25,S$GLB,, | Performed by: INTERNAL MEDICINE

## 2021-06-02 PROCEDURE — 1100F PR PT FALLS ASSESS DOC 2+ FALLS/FALL W/INJURY/YR: ICD-10-PCS | Mod: CPTII,S$GLB,, | Performed by: INTERNAL MEDICINE

## 2021-06-02 RX ORDER — SITAGLIPTIN 100 MG/1
100 TABLET, FILM COATED ORAL DAILY
COMMUNITY
Start: 2021-04-19

## 2021-06-02 RX ORDER — POTASSIUM CHLORIDE 750 MG/1
10 TABLET, EXTENDED RELEASE ORAL DAILY
COMMUNITY
Start: 2021-03-15

## 2021-06-24 ENCOUNTER — TELEPHONE (OUTPATIENT)
Dept: CARDIOLOGY | Facility: CLINIC | Age: 76
End: 2021-06-24

## 2021-07-15 ENCOUNTER — TELEPHONE (OUTPATIENT)
Dept: CARDIOLOGY | Facility: CLINIC | Age: 76
End: 2021-07-15

## 2021-09-15 ENCOUNTER — TELEPHONE (OUTPATIENT)
Dept: CARDIOLOGY | Facility: CLINIC | Age: 76
End: 2021-09-15

## 2021-09-22 ENCOUNTER — TELEPHONE (OUTPATIENT)
Dept: CARDIOLOGY | Facility: CLINIC | Age: 76
End: 2021-09-22

## 2021-10-04 RX ORDER — DILTIAZEM HYDROCHLORIDE 60 MG/1
60 TABLET, FILM COATED ORAL NIGHTLY
Qty: 90 TABLET | Refills: 3 | Status: SHIPPED | OUTPATIENT
Start: 2021-10-04 | End: 2022-12-27

## 2021-12-07 ENCOUNTER — TELEPHONE (OUTPATIENT)
Dept: CARDIOLOGY | Facility: CLINIC | Age: 76
End: 2021-12-07
Payer: MEDICARE

## 2022-10-07 ENCOUNTER — TELEPHONE (OUTPATIENT)
Dept: CARDIOLOGY | Facility: CLINIC | Age: 77
End: 2022-10-07
Payer: MEDICARE

## 2022-10-07 RX ORDER — DILTIAZEM HYDROCHLORIDE 240 MG/1
240 CAPSULE, COATED, EXTENDED RELEASE ORAL DAILY
Qty: 90 CAPSULE | Refills: 3 | Status: SHIPPED | OUTPATIENT
Start: 2022-10-07

## 2022-10-07 NOTE — TELEPHONE ENCOUNTER
----- Message from Francisco Javier Peterson sent at 10/7/2022 11:21 AM CDT -----  Type:  RX Refill Request    Who Called: pt   Ordering Provider:Yousef  Would the patient rather a call back or a response via [facility]? Call   Best Call Back Number: [phone]  Additional Information:     Checking to see if diltiaZEM (CARDIZEM CD) 240 MG 24 hr capsule was sent to her pharmacy   Pt stated she's been out for a week

## 2023-04-05 ENCOUNTER — TELEPHONE (OUTPATIENT)
Dept: CARDIOLOGY | Facility: CLINIC | Age: 78
End: 2023-04-05
Payer: MEDICARE

## 2023-04-05 NOTE — TELEPHONE ENCOUNTER
Patient calling for medication refills.  Patient has not been seen in 2 years.  Advised patient that she needs an appointment for refills.  Patient scheduled an appointment and will contact her PCP for a refill.    ----- Message from Rambo Archuleta sent at 4/5/2023  1:36 PM CDT -----  Regarding: call back  Pt returning miss call     Call

## 2023-05-01 ENCOUNTER — TELEPHONE (OUTPATIENT)
Dept: PODIATRY | Facility: CLINIC | Age: 78
End: 2023-05-01
Payer: MEDICARE

## 2023-05-01 RX ORDER — DILTIAZEM HYDROCHLORIDE 60 MG/1
TABLET, FILM COATED ORAL
Qty: 90 TABLET | Refills: 3 | Status: SHIPPED | OUTPATIENT
Start: 2023-05-01

## 2023-05-01 NOTE — TELEPHONE ENCOUNTER
----- Message from Demi Rizzo sent at 5/1/2023 12:29 PM CDT -----  Regarding: call back  Contact: [phone]  Type:  RX Refill Request    1.  Who Called: PT   Refill or New Rx: Refills   RX Name and Strength: diltiaZEM (CARDIZEM) 60 MG tablet 90 tablet   How is the patient currently taking it? (ex. 1XDay): 1 x a Day   Is this a 30 day or 90 day RX: 90  Preferred Pharmacy with phone number: [id number] [place], [place] - [address]  & [facility] Phone: [phone] Fax:[phone]    2. diltiaZEM (CARDIZEM CD) 240 MG 24 hr capsule 90 capsule